# Patient Record
Sex: MALE
[De-identification: names, ages, dates, MRNs, and addresses within clinical notes are randomized per-mention and may not be internally consistent; named-entity substitution may affect disease eponyms.]

---

## 2020-07-15 ENCOUNTER — NURSE TRIAGE (OUTPATIENT)
Dept: OTHER | Facility: CLINIC | Age: 80
End: 2020-07-15

## 2020-07-15 NOTE — TELEPHONE ENCOUNTER
Reason for Disposition   [1] SEVERE back pain (e.g., excruciating, unable to do any normal activities) AND [2] not improved 2 hours after pain medicine    Answer Assessment - Initial Assessment Questions  1. ONSET: \"When did the pain begin? \"       7/12/2020  2. LOCATION: \"Where does it hurt? \" (upper, mid or lower back)      Lower back and runs down left leg and feels achy  3. SEVERITY: \"How bad is the pain? \"  (e.g., Scale 1-10; mild, moderate, or severe)    - MILD (1-3): doesn't interfere with normal activities     - MODERATE (4-7): interferes with normal activities or awakens from sleep     - SEVERE (8-10): excruciating pain, unable to do any normal activities       8  4. PATTERN: \"Is the pain constant? \" (e.g., yes, no; constant, intermittent)       Comes and goes  5. RADIATION: \"Does the pain shoot into your legs or elsewhere? \"      Yes, down the left leg  6. CAUSE:  \"What do you think is causing the back pain? \"       Infection from surgery  7. BACK OVERUSE:  [place] recent lifting of heavy objects, strenuous work or exercise? \"      no  8. MEDICATIONS: \"What have you taken so far for the pain? \" (e.g., nothing, acetaminophen, NSAIDS)      Taking pain meds from surgery  9. NEUROLOGIC SYMPTOMS: \"Do you have any weakness, numbness, or problems with bowel/bladder control? \"      no  10. OTHER SYMPTOMS: \"Do you have any other symptoms? \" (e.g., fever, abdominal pain, burning with urination, blood in urine)        Sweating, chills. 11. PREGNANCY: \"Is there any chance you are pregnant? \" (e.g., yes, no; LMP)        n/a    Protocols used: BACK PAIN-ADULT-    Caller had surgery on 7/6/2020. Caller started having chills, sweating, and increased pain that is running down his left leg on 7/10/2020. He has tried to call them and they have not returned his call. No SOB and no chest pain. Recommendation See HCP within 4 hours, or go to [address] [place], [place] [address]r [place].

## 2023-08-28 PROBLEM — M47.816 FACET DEGENERATION OF LUMBAR REGION: Status: ACTIVE | Noted: 2023-08-28

## 2023-08-28 PROBLEM — M54.50 LOW BACK PAIN: Status: ACTIVE | Noted: 2023-08-28

## 2023-08-28 PROBLEM — C61 PROSTATE CANCER (MULTI): Status: ACTIVE | Noted: 2023-08-28

## 2023-08-28 PROBLEM — H91.90 ACQUIRED HEARING LOSS: Status: ACTIVE | Noted: 2023-08-28

## 2023-08-28 PROBLEM — M54.16 LUMBAR RADICULITIS: Status: ACTIVE | Noted: 2023-08-28

## 2023-08-28 PROBLEM — E78.2 MIXED HYPERLIPIDEMIA: Status: ACTIVE | Noted: 2023-08-28

## 2023-08-28 PROBLEM — I10 ESSENTIAL HYPERTENSION: Status: ACTIVE | Noted: 2023-08-28

## 2023-08-28 PROBLEM — H61.23 BILATERAL IMPACTED CERUMEN: Status: ACTIVE | Noted: 2023-08-28

## 2023-08-28 PROBLEM — M19.90 DEGENERATIVE ARTHRITIS: Status: ACTIVE | Noted: 2023-08-28

## 2023-08-28 PROBLEM — E78.00 PURE HYPERCHOLESTEROLEMIA: Status: ACTIVE | Noted: 2023-08-28

## 2023-08-28 PROBLEM — M96.1 LUMBAR POST-LAMINECTOMY SYNDROME: Status: ACTIVE | Noted: 2023-08-28

## 2023-08-28 PROBLEM — J30.2 SEASONAL ALLERGIC RHINITIS: Status: ACTIVE | Noted: 2023-08-28

## 2023-08-28 PROBLEM — M54.2 CERVICALGIA: Status: ACTIVE | Noted: 2023-08-28

## 2023-08-28 PROBLEM — K21.9 GASTRO-ESOPHAGEAL REFLUX DISEASE WITHOUT ESOPHAGITIS: Status: ACTIVE | Noted: 2023-08-28

## 2023-08-28 PROBLEM — R06.02 SHORTNESS OF BREATH: Status: ACTIVE | Noted: 2023-08-28

## 2023-08-28 PROBLEM — J84.10 POSTINFLAMMATORY PULMONARY FIBROSIS (MULTI): Status: ACTIVE | Noted: 2023-08-28

## 2023-08-28 PROBLEM — J31.0 CHRONIC RHINITIS: Status: ACTIVE | Noted: 2023-08-28

## 2023-08-28 PROBLEM — M48.00 SPINAL STENOSIS: Status: ACTIVE | Noted: 2023-08-28

## 2023-08-28 PROBLEM — I10 BENIGN HYPERTENSION: Status: RESOLVED | Noted: 2023-08-28 | Resolved: 2023-08-28

## 2023-08-28 PROBLEM — M25.559 HIP PAIN, CHRONIC: Status: ACTIVE | Noted: 2023-08-28

## 2023-08-28 PROBLEM — Z98.890 STATUS POST LUMBAR SPINE SURGERY FOR DECOMPRESSION OF SPINAL CORD: Status: ACTIVE | Noted: 2023-08-28

## 2023-08-28 PROBLEM — G89.29 HIP PAIN, CHRONIC: Status: ACTIVE | Noted: 2023-08-28

## 2023-08-28 PROBLEM — J32.9 SINUSITIS: Status: ACTIVE | Noted: 2023-08-28

## 2023-08-28 PROBLEM — E55.9 VITAMIN D DEFICIENCY: Status: ACTIVE | Noted: 2023-08-28

## 2023-08-28 PROBLEM — M54.16 CHRONIC LUMBAR RADICULOPATHY: Status: ACTIVE | Noted: 2023-08-28

## 2023-08-28 PROBLEM — G62.9 NEUROPATHY: Status: ACTIVE | Noted: 2023-08-28

## 2023-08-28 PROBLEM — G56.00 CARPAL TUNNEL SYNDROME: Status: ACTIVE | Noted: 2023-08-28

## 2023-08-28 PROBLEM — M48.062 LUMBAR STENOSIS WITH NEUROGENIC CLAUDICATION: Status: ACTIVE | Noted: 2023-08-28

## 2023-08-28 PROBLEM — I20.9 ANGINA PECTORIS (CMS-HCC): Status: ACTIVE | Noted: 2023-08-28

## 2023-08-28 PROBLEM — R49.0 HOARSENESS: Status: ACTIVE | Noted: 2023-08-28

## 2023-08-28 PROBLEM — E16.1 REACTIVE HYPOGLYCEMIA: Status: ACTIVE | Noted: 2023-08-28

## 2023-08-28 PROBLEM — M43.10 ACQUIRED SPONDYLOLISTHESIS: Status: ACTIVE | Noted: 2023-08-28

## 2023-08-28 RX ORDER — ACETAMINOPHEN 325 MG/1
2 TABLET ORAL EVERY 4 HOURS PRN
Status: ON HOLD | COMMUNITY
End: 2024-05-23 | Stop reason: WASHOUT

## 2023-08-28 RX ORDER — PREDNISOLONE ACETATE 10 MG/ML
SUSPENSION/ DROPS OPHTHALMIC
COMMUNITY
Start: 2022-10-04 | End: 2023-11-02 | Stop reason: ALTCHOICE

## 2023-08-28 RX ORDER — VIT A/VIT C/VIT E/ZINC/COPPER 4296-226
1 CAPSULE ORAL DAILY
COMMUNITY

## 2023-08-28 RX ORDER — VIT C/E/ZN/COPPR/LUTEIN/ZEAXAN 250MG-90MG
1 CAPSULE ORAL DAILY
COMMUNITY

## 2023-08-28 RX ORDER — ATORVASTATIN CALCIUM 10 MG/1
1 TABLET, FILM COATED ORAL DAILY
COMMUNITY
Start: 2015-01-26 | End: 2023-12-16 | Stop reason: SDUPTHER

## 2023-08-28 RX ORDER — ATENOLOL AND CHLORTHALIDONE TABLET 100; 25 MG/1; MG/1
0.5 TABLET ORAL DAILY
COMMUNITY
Start: 2015-01-26

## 2023-08-28 RX ORDER — LANOLIN ALCOHOL/MO/W.PET/CERES
1 CREAM (GRAM) TOPICAL DAILY
COMMUNITY
Start: 2015-08-11

## 2023-08-28 RX ORDER — VIT C/E/ZN/COPPR/LUTEIN/ZEAXAN 250MG-90MG
1 CAPSULE ORAL DAILY
COMMUNITY
End: 2023-11-02 | Stop reason: ALTCHOICE

## 2023-08-28 RX ORDER — DEXTROMETHORPHAN HYDROBROMIDE, GUAIFENESIN 5; 100 MG/5ML; MG/5ML
1 LIQUID ORAL 3 TIMES DAILY
COMMUNITY
End: 2023-11-02 | Stop reason: ALTCHOICE

## 2023-08-28 RX ORDER — AZELASTINE 1 MG/ML
SPRAY, METERED NASAL AS NEEDED
COMMUNITY
Start: 2023-01-10

## 2023-08-28 RX ORDER — FLUTICASONE PROPIONATE 50 MCG
1 SPRAY, SUSPENSION (ML) NASAL AS NEEDED
COMMUNITY
Start: 2022-09-29

## 2023-11-02 ENCOUNTER — OFFICE VISIT (OUTPATIENT)
Dept: PRIMARY CARE | Facility: CLINIC | Age: 83
End: 2023-11-02
Payer: MEDICARE

## 2023-11-02 VITALS
HEIGHT: 65 IN | TEMPERATURE: 97.5 F | HEART RATE: 67 BPM | BODY MASS INDEX: 27.66 KG/M2 | OXYGEN SATURATION: 97 % | DIASTOLIC BLOOD PRESSURE: 65 MMHG | WEIGHT: 166 LBS | SYSTOLIC BLOOD PRESSURE: 118 MMHG

## 2023-11-02 DIAGNOSIS — E55.9 VITAMIN D DEFICIENCY: ICD-10-CM

## 2023-11-02 DIAGNOSIS — R73.01 IMPAIRED FASTING BLOOD SUGAR: ICD-10-CM

## 2023-11-02 DIAGNOSIS — M54.16 CHRONIC LUMBAR RADICULOPATHY: ICD-10-CM

## 2023-11-02 DIAGNOSIS — G62.9 NEUROPATHY: ICD-10-CM

## 2023-11-02 DIAGNOSIS — Z85.46 HISTORY OF PROSTATE CANCER: ICD-10-CM

## 2023-11-02 DIAGNOSIS — E78.2 MIXED HYPERLIPIDEMIA: ICD-10-CM

## 2023-11-02 DIAGNOSIS — I10 ESSENTIAL HYPERTENSION: Primary | ICD-10-CM

## 2023-11-02 DIAGNOSIS — M48.061 SPINAL STENOSIS OF LUMBAR REGION WITHOUT NEUROGENIC CLAUDICATION: ICD-10-CM

## 2023-11-02 PROCEDURE — 99214 OFFICE O/P EST MOD 30 MIN: CPT | Performed by: INTERNAL MEDICINE

## 2023-11-02 PROCEDURE — 3078F DIAST BP <80 MM HG: CPT | Performed by: INTERNAL MEDICINE

## 2023-11-02 PROCEDURE — 1159F MED LIST DOCD IN RCRD: CPT | Performed by: INTERNAL MEDICINE

## 2023-11-02 PROCEDURE — 3074F SYST BP LT 130 MM HG: CPT | Performed by: INTERNAL MEDICINE

## 2023-11-02 PROCEDURE — 1036F TOBACCO NON-USER: CPT | Performed by: INTERNAL MEDICINE

## 2023-11-02 PROCEDURE — 90471 IMMUNIZATION ADMIN: CPT | Performed by: INTERNAL MEDICINE

## 2023-11-02 PROCEDURE — 1126F AMNT PAIN NOTED NONE PRSNT: CPT | Performed by: INTERNAL MEDICINE

## 2023-11-02 PROCEDURE — 90715 TDAP VACCINE 7 YRS/> IM: CPT | Performed by: INTERNAL MEDICINE

## 2023-11-02 ASSESSMENT — ENCOUNTER SYMPTOMS
OCCASIONAL FEELINGS OF UNSTEADINESS: 0
PALPITATIONS: 0
DEPRESSION: 0
SHORTNESS OF BREATH: 0
LOSS OF SENSATION IN FEET: 0

## 2023-11-02 ASSESSMENT — PATIENT HEALTH QUESTIONNAIRE - PHQ9
1. LITTLE INTEREST OR PLEASURE IN DOING THINGS: NOT AT ALL
SUM OF ALL RESPONSES TO PHQ9 QUESTIONS 1 AND 2: 0
2. FEELING DOWN, DEPRESSED OR HOPELESS: NOT AT ALL

## 2023-11-02 ASSESSMENT — PAIN SCALES - GENERAL: PAINLEVEL: 0-NO PAIN

## 2023-11-02 NOTE — PATIENT INSTRUCTIONS
Shingrix shingles vaccine series of 2  by 2 to 3 months, RSV (respiratory syncytial virus), flu vaccine, COVID 19 new vaccine 10-11/23.  All can be obtained at the local pharmacies.    Tdap vaccine today.

## 2023-11-02 NOTE — PROGRESS NOTES
[facility]: [facility]  PROGRESS NOTE      Jonathan Campos is a 83 y.o. male that is presenting today for GERD (6 mo fu).    Assessment/Plan   Diagnoses and all orders for this visit:  Essential hypertension  Comments:  BP doing OK.  Mixed hyperlipidemia  -     Lipid Panel; Future  -     Comprehensive Metabolic Panel; Future  Chronic lumbar radiculopathy  History of prostate cancer  Spinal stenosis of lumbar region without neurogenic claudication  Neuropathy  -     Vitamin B12; Future  Impaired fasting blood sugar  -     Hemoglobin A1C; Future  -     CBC; Future  Vitamin D deficiency  Other orders  -     Follow Up In Primary Care - Medicare Annual; Future  -     Tdap vaccine, age 7 years and older  (BOOSTRIX)    Subjective   HTN, diet reviewed., IFBS., chol. DJD chronic, stable. Vaccines reviwed. Right eye , Retinal doc, membrane over retina. Delcines surgery risk of detachment. No night time drinvg just during day, short distances.    GERD  He reports no chest pain.     Review of Systems   Respiratory:  Negative for shortness of breath.    Cardiovascular:  Negative for chest pain and palpitations.   All other systems reviewed and are negative.     Objective   Vitals:    11/02/23 1338   BP: 118/65   Pulse: 67   Temp: 36.4 °C (97.5 °F)   SpO2: 97%      Body mass index is 27.62 kg/m².  Physical Exam  Constitutional:       General: He is not in acute distress.  HENT:      Head: Normocephalic and atraumatic.      Right Ear: Tympanic membrane normal.      Left Ear: Tympanic membrane normal.      Mouth/Throat:      Mouth: Mucous membranes are moist.      Pharynx: Oropharynx is clear.   Eyes:      Extraocular Movements: Extraocular movements intact.      Conjunctiva/sclera: Conjunctivae normal.      Pupils: Pupils are equal, round, and reactive to light.   Cardiovascular:      Rate and Rhythm: Normal rate and regular rhythm.   Pulmonary:      Breath sounds: Normal breath sounds.   Abdominal:  "     General: Bowel sounds are normal.      Palpations: Abdomen is soft. There is no mass.   Musculoskeletal:         General: Normal range of motion.      Cervical back: Neck supple. No tenderness.   Skin:     General: Skin is warm and dry.   Neurological:      General: No focal deficit present.      Mental Status: He is oriented to person, place, and time.     Diagnostic Results   Lab Results   Component Value Date    GLUCOSE 110 (H) 04/28/2023    CALCIUM 8.9 04/28/2023     04/28/2023    K 4.1 04/28/2023    CO2 28 04/28/2023     04/28/2023    BUN 17 04/28/2023    CREATININE 1.2 04/28/2023     Lab Results   Component Value Date    ALT 10 09/30/2022    AST 14 09/30/2022    ALKPHOS 63 09/30/2022    BILITOT 0.5 09/30/2022     Lab Results   Component Value Date    WBC 6.3 09/30/2022    HGB 14.4 09/30/2022    HCT 45.8 09/30/2022    MCV 89.8 09/30/2022     09/30/2022     Lab Results   Component Value Date    CHOL 152 09/30/2022    CHOL 151 09/09/2021    CHOL 159 03/12/2021     Lab Results   Component Value Date    HDL 32 (L) 09/30/2022    HDL 30 (L) 09/09/2021    HDL 36 (L) 03/12/2021     Lab Results   Component Value Date    LDLCALC 94 09/30/2022    LDLCALC 93 09/09/2021    LDLCALC 101 03/12/2021     Lab Results   Component Value Date    TRIG 128 09/30/2022    TRIG 141 09/09/2021    TRIG 109 03/12/2021     No components found for: \"CHOLHDL\"  No results found for: \"HGBA1C\"  Other labs not included in the list above were reviewed either before or during this encounter.    History    Past Medical History:   Diagnosis Date   • Chronic lumbar radiculopathy 08/28/2023   • Gastro-esophageal reflux disease without esophagitis 08/28/2023   • History of prostate cancer 11/02/2023    '99 prostectomy    • Impaired fasting blood sugar 11/02/2023   • Neuropathy 08/28/2023    Back related   • Personal history of malignant neoplasm of prostate     History of malignant neoplasm of prostate   • Spinal " "stenosis 08/28/2023   • Vitamin D deficiency 08/28/2023     Past Surgical History:   Procedure Laterality Date   • CT GUIDED PERCUTANEOUS BIOPSY BONE DEEP  12/09/2020    CT GUIDED PERCUTANEOUS BIOPSY BONE DEEP 12/9/2020 [facility]   • OTHER SURGICAL HISTORY  12/01/2020    Lumbar laminectomy 9/20    • OTHER SURGICAL HISTORY  12/01/2020    Spinal instrumentation placement   • OTHER SURGICAL HISTORY  05/03/2022    Prostate surgery     Family History   Problem Relation Name Age of Onset   • Cancer Mother       Social History     Socioeconomic History   • Marital status:      Spouse name: Not on file   • Number of children: Not on file   • Years of education: Not on file   • Highest education level: Not on file   Occupational History   • Not on file   Tobacco Use   • Smoking status: Never     Passive exposure: Never   • Smokeless tobacco: Never   Vaping Use   • Vaping Use: Never used   Substance and Sexual Activity   • Alcohol use: Never   • Drug use: Never   • Sexual activity: Not on file   Other Topics Concern   • Not on file   Social History Narrative   • Not on file     Social Determinants of Health     Financial Resource Strain: Not on file   Food Insecurity: Not on file   Transportation Needs: Not on file   Physical Activity: Not on file   Stress: Not on file   Social Connections: Not on file   Intimate Partner Violence: Not on file   Housing Stability: Not on file     Allergies   Allergen Reactions   • Carisoprodol Other and Unknown     \"Spaced out\"   • Pravastatin Sodium Other     myalgias   • Simvastatin Other     myalgias     Current Outpatient Medications on File Prior to Visit   Medication Sig Dispense Refill   • acetaminophen (TylenoL) 325 mg tablet Take 2 tablets (650 mg) by mouth every 4 hours if needed.     • atenoloL-chlorthalidone (Tenoretic) 100-25 mg tablet Take 0.5 tablets by mouth once daily.     • atorvastatin (Lipitor) 10 mg tablet Take 1 tablet (10 mg) by mouth once daily.   "   • azelastine (Astelin) 137 mcg (0.1 %) nasal spray      • cholecalciferol (Vitamin D-3) 25 MCG (1000 UT) capsule Take 1 capsule (25 mcg) by mouth once daily.     • cyanocobalamin (Vitamin B-12) 1,000 mcg tablet Take 1 tablet (1,000 mcg) by mouth once daily.     • fluticasone (Flonase) 50 mcg/actuation nasal spray Administer 1 spray into each nostril once daily.     • vitamins A,C,E-zinc-copper (PreserVision AREDS) 4,296 mcg-226 mg-90 mg capsule Take 1 tablet by mouth once daily.     • [DISCONTINUED] acetaminophen (Tylenol 8 Hour) 650 mg ER tablet Take 1 tablet (650 mg) by mouth 3 times a day.     • [DISCONTINUED] prednisoLONE acetate (Pred-Forte) 1 % ophthalmic suspension Administer into affected eye(s).     • [DISCONTINUED] vit C,Q-Qj-tbncw-lutein-zeaxan (PreserVision AREDS-2) 250-90-40-1 mg capsule Take 1 capsule by mouth once daily.       No current facility-administered medications on file prior to visit.     Immunization History   Administered Date(s) Administered   • Flu vaccine, quadrivalent, high-dose, preservative free, age 65y+ (FLUZONE) 09/21/2020, 09/27/2021   • Influenza, High Dose Seasonal, Preservative Free 11/03/2016, 10/15/2018   • Influenza, injectable, quadrivalent 11/06/2017   • Influenza, seasonal, injectable 10/19/2010, 09/15/2011, 10/11/2012, 09/12/2013, 10/16/2014   • Moderna SARS-CoV-2 Vaccination 01/01/2021, 01/29/2021, 02/28/2021   • Pneumococcal conjugate vaccine, 13-valent (PREVNAR 13) 10/20/2015   • Pneumococcal polysaccharide vaccine, 23-valent, age 2 years and older (PNEUMOVAX 23) 12/01/2005, 11/03/2016   • Td vaccine, age 7 years and older (TDVAX) 09/01/2002   • Tdap vaccine, age 7 year and older (BOOSTRIX) 03/14/2013   • Zoster, live 04/16/2015     Patient's medical history was reviewed and updated either before or during this encounter.       Jon Mcintyre MD

## 2023-11-03 ENCOUNTER — LAB (OUTPATIENT)
Dept: LAB | Facility: LAB | Age: 83
End: 2023-11-03
Payer: MEDICARE

## 2023-11-03 DIAGNOSIS — E55.9 VITAMIN D DEFICIENCY: ICD-10-CM

## 2023-11-03 DIAGNOSIS — E78.2 MIXED HYPERLIPIDEMIA: ICD-10-CM

## 2023-11-03 DIAGNOSIS — G62.9 NEUROPATHY: ICD-10-CM

## 2023-11-03 DIAGNOSIS — R73.01 IMPAIRED FASTING BLOOD SUGAR: ICD-10-CM

## 2023-11-03 LAB
25(OH)D3 SERPL-MCNC: 64 NG/ML (ref 30–100)
ALBUMIN SERPL BCP-MCNC: 3.7 G/DL (ref 3.4–5)
ALP SERPL-CCNC: 51 U/L (ref 33–136)
ALT SERPL W P-5'-P-CCNC: 10 U/L (ref 10–52)
ANION GAP SERPL CALC-SCNC: 11 MMOL/L (ref 10–20)
AST SERPL W P-5'-P-CCNC: 15 U/L (ref 9–39)
BILIRUB SERPL-MCNC: 0.5 MG/DL (ref 0–1.2)
BUN SERPL-MCNC: 14 MG/DL (ref 6–23)
CALCIUM SERPL-MCNC: 9.1 MG/DL (ref 8.6–10.3)
CHLORIDE SERPL-SCNC: 101 MMOL/L (ref 98–107)
CHOLEST SERPL-MCNC: 132 MG/DL (ref 0–199)
CHOLESTEROL/HDL RATIO: 4
CO2 SERPL-SCNC: 32 MMOL/L (ref 21–32)
CREAT SERPL-MCNC: 1.07 MG/DL (ref 0.5–1.3)
ERYTHROCYTE [DISTWIDTH] IN BLOOD BY AUTOMATED COUNT: 13.8 % (ref 11.5–14.5)
EST. AVERAGE GLUCOSE BLD GHB EST-MCNC: 123 MG/DL
GFR SERPL CREATININE-BSD FRML MDRD: 68 ML/MIN/1.73M*2
GLUCOSE SERPL-MCNC: 99 MG/DL (ref 74–99)
HBA1C MFR BLD: 5.9 %
HCT VFR BLD AUTO: 46.9 % (ref 41–52)
HDLC SERPL-MCNC: 32.9 MG/DL
HGB BLD-MCNC: 14.6 G/DL (ref 13.5–17.5)
LDLC SERPL CALC-MCNC: 79 MG/DL
MCH RBC QN AUTO: 28.6 PG (ref 26–34)
MCHC RBC AUTO-ENTMCNC: 31.1 G/DL (ref 32–36)
MCV RBC AUTO: 92 FL (ref 80–100)
NON HDL CHOLESTEROL: 99 MG/DL (ref 0–149)
NRBC BLD-RTO: 0 /100 WBCS (ref 0–0)
PLATELET # BLD AUTO: 220 X10*3/UL (ref 150–450)
POTASSIUM SERPL-SCNC: 3.5 MMOL/L (ref 3.5–5.3)
PROT SERPL-MCNC: 6.4 G/DL (ref 6.4–8.2)
RBC # BLD AUTO: 5.1 X10*6/UL (ref 4.5–5.9)
SODIUM SERPL-SCNC: 140 MMOL/L (ref 136–145)
TRIGL SERPL-MCNC: 99 MG/DL (ref 0–149)
VIT B12 SERPL-MCNC: 957 PG/ML (ref 211–911)
VLDL: 20 MG/DL (ref 0–40)
WBC # BLD AUTO: 6.4 X10*3/UL (ref 4.4–11.3)

## 2023-11-03 PROCEDURE — 82607 VITAMIN B-12: CPT

## 2023-11-03 PROCEDURE — 83036 HEMOGLOBIN GLYCOSYLATED A1C: CPT

## 2023-11-03 PROCEDURE — 36415 COLL VENOUS BLD VENIPUNCTURE: CPT

## 2023-11-03 PROCEDURE — 82306 VITAMIN D 25 HYDROXY: CPT

## 2023-12-16 DIAGNOSIS — E78.2 MIXED HYPERLIPIDEMIA: Primary | ICD-10-CM

## 2023-12-17 RX ORDER — ATORVASTATIN CALCIUM 10 MG/1
10 TABLET, FILM COATED ORAL DAILY
Qty: 90 TABLET | Refills: 3 | Status: SHIPPED | OUTPATIENT
Start: 2023-12-17 | End: 2024-12-11

## 2024-01-09 ENCOUNTER — OFFICE VISIT (OUTPATIENT)
Dept: OTOLARYNGOLOGY | Facility: CLINIC | Age: 84
End: 2024-01-09
Payer: MEDICARE

## 2024-01-09 VITALS — BODY MASS INDEX: 27.66 KG/M2 | HEIGHT: 65 IN | WEIGHT: 166 LBS | TEMPERATURE: 97.1 F

## 2024-01-09 DIAGNOSIS — J31.0 CHRONIC RHINITIS: ICD-10-CM

## 2024-01-09 DIAGNOSIS — H61.20 CERUMEN IN AUDITORY CANAL ON EXAMINATION: Primary | ICD-10-CM

## 2024-01-09 PROCEDURE — 1036F TOBACCO NON-USER: CPT | Performed by: OTOLARYNGOLOGY

## 2024-01-09 PROCEDURE — 1160F RVW MEDS BY RX/DR IN RCRD: CPT | Performed by: OTOLARYNGOLOGY

## 2024-01-09 PROCEDURE — 1126F AMNT PAIN NOTED NONE PRSNT: CPT | Performed by: OTOLARYNGOLOGY

## 2024-01-09 PROCEDURE — 99213 OFFICE O/P EST LOW 20 MIN: CPT | Performed by: OTOLARYNGOLOGY

## 2024-01-09 PROCEDURE — 1159F MED LIST DOCD IN RCRD: CPT | Performed by: OTOLARYNGOLOGY

## 2024-01-09 NOTE — PROGRESS NOTES
"HPI  Jonathan Campos is a 83 y.o. male history of chronic rhinitis.  Does well with Jasmine pot and uses azelastine infrequently.  He has required cerumen management in the past but is using Debrox regularly and it has not been bubbling much recently.  Feels his hearing is stable.  Denies otalgia and otorrhea.      Past Medical History:   Diagnosis Date    Chronic lumbar radiculopathy 08/28/2023    Gastro-esophageal reflux disease without esophagitis 08/28/2023    History of prostate cancer 11/02/2023    '99 prostectomy     Impaired fasting blood sugar 11/02/2023    Neuropathy 08/28/2023    Back related    Personal history of malignant neoplasm of prostate     History of malignant neoplasm of prostate    Spinal stenosis 08/28/2023    Vitamin D deficiency 08/28/2023            Medications:     Current Outpatient Medications:     acetaminophen (TylenoL) 325 mg tablet, Take 2 tablets (650 mg) by mouth every 4 hours if needed., Disp: , Rfl:     atenoloL-chlorthalidone (Tenoretic) 100-25 mg tablet, Take 0.5 tablets by mouth once daily., Disp: , Rfl:     atorvastatin (Lipitor) 10 mg tablet, Take 1 tablet (10 mg) by mouth once daily., Disp: 90 tablet, Rfl: 3    cholecalciferol (Vitamin D-3) 25 MCG (1000 UT) capsule, Take 1 capsule (25 mcg) by mouth once daily., Disp: , Rfl:     cyanocobalamin (Vitamin B-12) 1,000 mcg tablet, Take 1 tablet (1,000 mcg) by mouth once daily., Disp: , Rfl:     vitamins A,C,E-zinc-copper (PreserVision AREDS) 4,296 mcg-226 mg-90 mg capsule, Take 1 tablet by mouth once daily., Disp: , Rfl:     azelastine (Astelin) 137 mcg (0.1 %) nasal spray, , Disp: , Rfl:     fluticasone (Flonase) 50 mcg/actuation nasal spray, Administer 1 spray into each nostril once daily., Disp: , Rfl:      Allergies:  Allergies   Allergen Reactions    Carisoprodol Other and Unknown     \"Spaced out\"    Pravastatin Sodium Other     myalgias    Simvastatin Other     myalgias        Physical Exam:  Last Recorded " "Vitals  Temperature 36.2 °C (97.1 °F), height 1.651 m (5' 5\"), weight 75.3 kg (166 lb).  General:     General appearance: Well-developed, well-nourished in no acute distress.       Voice:  normal       Head/face: Normal appearance; nontender to palpation     Facial nerve function: Normal and symmetric bilaterally.    Oral/oropharynx:     Oral vestibule: Normal labial and gingival mucosa     Tongue/floor of mouth: Normal without lesion     Oropharynx: Clear.  No lesions present of the hard/soft palate, posterior pharynx    Neck:     Neck: Normal appearance, trachea midline     Salivary glands: Normal to palpation bilaterally     Lymph nodes: No cervical lymphadenopathy to palpation     Thyroid: No thyromegaly.  No palpable nodules     Range of motion: Normal    Neurological:     Cortical functions: Alert and oriented x3, appropriate affect       Larynx/hypopharynx:     Laryngeal findings: Mirror exam inadequate or limited secondary to enlarged base of tongue and/or excessive gagging    Ear:     Ear canal: Normal bilaterally.  A couple hairs were removed bilaterally with microscope and alligator.  Some wax on the left was removed with wire-loop.  Generally pretty clean     Tympanic membrane: Intact and mobile bilaterally     Pinna: Normal bilaterally     Hearing:  Gross hearing assessment normal by voice    Nose:     Visualized using: Anterior rhinoscopy     Nasopharynx: Inadequate mirror exam secondary to gag, anatomy.       Nasal dorsum: Nontraumatic midline appearance     Septum: Midline     Inferior turbinates: Normally sized     Mucosa: Bilateral, pink, normal appearing       Assessment/Plan   He had very little debris in his ear canals today.  He is doing well from the perspective of his rhinitis.  We will extend his visits to annually from semiannually and I will see him back in a year, sooner as needed         Timothy Martinez MD  "

## 2024-05-14 PROBLEM — M25.559 ARTHRALGIA OF HIP: Status: ACTIVE | Noted: 2022-11-28

## 2024-05-14 PROBLEM — M48.061 SPINAL STENOSIS OF LUMBAR REGION: Status: ACTIVE | Noted: 2018-03-16

## 2024-05-14 PROBLEM — E66.3 OVERWEIGHT WITH BODY MASS INDEX (BMI) 25.0-29.9: Status: ACTIVE | Noted: 2024-05-14

## 2024-05-14 PROBLEM — H61.20 WAX IN EAR: Status: ACTIVE | Noted: 2024-05-14

## 2024-05-14 PROBLEM — R10.30 LOWER ABDOMINAL PAIN: Status: ACTIVE | Noted: 2024-05-14

## 2024-05-14 PROBLEM — J32.9 SINUSITIS: Status: ACTIVE | Noted: 2024-05-14

## 2024-05-14 PROBLEM — C61 CARCINOMA OF PROSTATE (MULTI): Status: ACTIVE | Noted: 2024-05-14

## 2024-05-14 PROBLEM — H90.3 SENSORINEURAL HEARING LOSS (SNHL) OF BOTH EARS: Status: ACTIVE | Noted: 2024-05-14

## 2024-05-14 PROBLEM — H61.20 IMPACTED CERUMEN: Status: ACTIVE | Noted: 2024-05-14

## 2024-05-14 PROBLEM — J84.10 POSTINFLAMMATORY PULMONARY FIBROSIS (MULTI): Status: ACTIVE | Noted: 2022-09-30

## 2024-05-14 PROBLEM — M43.10 DEGENERATIVE SPONDYLOLISTHESIS: Status: ACTIVE | Noted: 2018-03-16

## 2024-05-14 PROBLEM — R05.8 OTHER SPECIFIED COUGH: Status: ACTIVE | Noted: 2022-11-28

## 2024-05-14 PROBLEM — E78.00 PURE HYPERCHOLESTEROLEMIA: Status: ACTIVE | Noted: 2022-09-30

## 2024-05-14 PROBLEM — I10 BENIGN HYPERTENSION: Status: ACTIVE | Noted: 2022-03-17

## 2024-05-14 PROBLEM — E16.1 REACTIVE HYPOGLYCEMIA: Status: ACTIVE | Noted: 2024-05-14

## 2024-05-14 PROBLEM — N32.81 OVERACTIVE BLADDER: Status: ACTIVE | Noted: 2024-05-14

## 2024-05-23 ENCOUNTER — APPOINTMENT (OUTPATIENT)
Dept: RADIOLOGY | Facility: HOSPITAL | Age: 84
End: 2024-05-23
Payer: MEDICARE

## 2024-05-23 ENCOUNTER — HOSPITAL ENCOUNTER (OUTPATIENT)
Facility: HOSPITAL | Age: 84
Setting detail: OBSERVATION
Discharge: HOME | End: 2024-05-24
Attending: FAMILY MEDICINE | Admitting: INTERNAL MEDICINE
Payer: MEDICARE

## 2024-05-23 DIAGNOSIS — K29.90 GASTRITIS AND DUODENITIS: ICD-10-CM

## 2024-05-23 DIAGNOSIS — K85.90 ACUTE PANCREATITIS, UNSPECIFIED COMPLICATION STATUS, UNSPECIFIED PANCREATITIS TYPE (HHS-HCC): Primary | ICD-10-CM

## 2024-05-23 DIAGNOSIS — E87.6 HYPOKALEMIA: ICD-10-CM

## 2024-05-23 LAB
ALBUMIN SERPL BCP-MCNC: 4.1 G/DL (ref 3.4–5)
ALP SERPL-CCNC: 50 U/L (ref 33–136)
ALT SERPL W P-5'-P-CCNC: 16 U/L (ref 10–52)
ANION GAP SERPL CALC-SCNC: 12 MMOL/L (ref 10–20)
AST SERPL W P-5'-P-CCNC: 20 U/L (ref 9–39)
BASOPHILS # BLD AUTO: 0.03 X10*3/UL (ref 0–0.1)
BASOPHILS NFR BLD AUTO: 0.2 %
BILIRUB SERPL-MCNC: 1.1 MG/DL (ref 0–1.2)
BUN SERPL-MCNC: 18 MG/DL (ref 6–23)
CALCIUM SERPL-MCNC: 9.5 MG/DL (ref 8.6–10.3)
CHLORIDE SERPL-SCNC: 95 MMOL/L (ref 98–107)
CO2 SERPL-SCNC: 30 MMOL/L (ref 21–32)
CREAT SERPL-MCNC: 1.15 MG/DL (ref 0.5–1.3)
EGFRCR SERPLBLD CKD-EPI 2021: 63 ML/MIN/1.73M*2
EOSINOPHIL # BLD AUTO: 0.12 X10*3/UL (ref 0–0.4)
EOSINOPHIL NFR BLD AUTO: 0.8 %
ERYTHROCYTE [DISTWIDTH] IN BLOOD BY AUTOMATED COUNT: 13.5 % (ref 11.5–14.5)
ERYTHROCYTE [DISTWIDTH] IN BLOOD BY AUTOMATED COUNT: 13.6 % (ref 11.5–14.5)
GLUCOSE SERPL-MCNC: 87 MG/DL (ref 74–99)
HCT VFR BLD AUTO: 38.5 % (ref 41–52)
HCT VFR BLD AUTO: 45.4 % (ref 41–52)
HGB BLD-MCNC: 12.4 G/DL (ref 13.5–17.5)
HGB BLD-MCNC: 14.5 G/DL (ref 13.5–17.5)
IMM GRANULOCYTES # BLD AUTO: 0.04 X10*3/UL (ref 0–0.5)
IMM GRANULOCYTES NFR BLD AUTO: 0.3 % (ref 0–0.9)
LIPASE SERPL-CCNC: 66 U/L (ref 9–82)
LYMPHOCYTES # BLD AUTO: 1.25 X10*3/UL (ref 0.8–3)
LYMPHOCYTES NFR BLD AUTO: 8.7 %
MCH RBC QN AUTO: 28.7 PG (ref 26–34)
MCH RBC QN AUTO: 29 PG (ref 26–34)
MCHC RBC AUTO-ENTMCNC: 31.9 G/DL (ref 32–36)
MCHC RBC AUTO-ENTMCNC: 32.2 G/DL (ref 32–36)
MCV RBC AUTO: 90 FL (ref 80–100)
MCV RBC AUTO: 90 FL (ref 80–100)
MONOCYTES # BLD AUTO: 1.34 X10*3/UL (ref 0.05–0.8)
MONOCYTES NFR BLD AUTO: 9.3 %
NEUTROPHILS # BLD AUTO: 11.61 X10*3/UL (ref 1.6–5.5)
NEUTROPHILS NFR BLD AUTO: 80.7 %
NRBC BLD-RTO: 0 /100 WBCS (ref 0–0)
NRBC BLD-RTO: 0 /100 WBCS (ref 0–0)
PLATELET # BLD AUTO: 186 X10*3/UL (ref 150–450)
PLATELET # BLD AUTO: 228 X10*3/UL (ref 150–450)
POTASSIUM SERPL-SCNC: 3.1 MMOL/L (ref 3.5–5.3)
PROT SERPL-MCNC: 7.4 G/DL (ref 6.4–8.2)
RBC # BLD AUTO: 4.28 X10*6/UL (ref 4.5–5.9)
RBC # BLD AUTO: 5.06 X10*6/UL (ref 4.5–5.9)
SODIUM SERPL-SCNC: 134 MMOL/L (ref 136–145)
WBC # BLD AUTO: 10.9 X10*3/UL (ref 4.4–11.3)
WBC # BLD AUTO: 14.4 X10*3/UL (ref 4.4–11.3)

## 2024-05-23 PROCEDURE — 85025 COMPLETE CBC W/AUTO DIFF WBC: CPT | Performed by: FAMILY MEDICINE

## 2024-05-23 PROCEDURE — A9575 INJ GADOTERATE MEGLUMI 0.1ML: HCPCS | Performed by: INTERNAL MEDICINE

## 2024-05-23 PROCEDURE — 74183 MRI ABD W/O CNTR FLWD CNTR: CPT

## 2024-05-23 PROCEDURE — 96374 THER/PROPH/DIAG INJ IV PUSH: CPT | Mod: 59

## 2024-05-23 PROCEDURE — G0378 HOSPITAL OBSERVATION PER HR: HCPCS

## 2024-05-23 PROCEDURE — 96361 HYDRATE IV INFUSION ADD-ON: CPT

## 2024-05-23 PROCEDURE — 74183 MRI ABD W/O CNTR FLWD CNTR: CPT | Performed by: RADIOLOGY

## 2024-05-23 PROCEDURE — 2500000001 HC RX 250 WO HCPCS SELF ADMINISTERED DRUGS (ALT 637 FOR MEDICARE OP): Performed by: NURSE PRACTITIONER

## 2024-05-23 PROCEDURE — 36415 COLL VENOUS BLD VENIPUNCTURE: CPT | Performed by: NURSE PRACTITIONER

## 2024-05-23 PROCEDURE — 83690 ASSAY OF LIPASE: CPT | Performed by: FAMILY MEDICINE

## 2024-05-23 PROCEDURE — 2500000004 HC RX 250 GENERAL PHARMACY W/ HCPCS (ALT 636 FOR OP/ED): Performed by: INTERNAL MEDICINE

## 2024-05-23 PROCEDURE — 36415 COLL VENOUS BLD VENIPUNCTURE: CPT | Performed by: FAMILY MEDICINE

## 2024-05-23 PROCEDURE — 2550000001 HC RX 255 CONTRASTS: Performed by: FAMILY MEDICINE

## 2024-05-23 PROCEDURE — 85027 COMPLETE CBC AUTOMATED: CPT | Mod: 59 | Performed by: NURSE PRACTITIONER

## 2024-05-23 PROCEDURE — 76376 3D RENDER W/INTRP POSTPROCES: CPT | Performed by: RADIOLOGY

## 2024-05-23 PROCEDURE — 2500000004 HC RX 250 GENERAL PHARMACY W/ HCPCS (ALT 636 FOR OP/ED): Performed by: NURSE PRACTITIONER

## 2024-05-23 PROCEDURE — 84075 ASSAY ALKALINE PHOSPHATASE: CPT | Performed by: FAMILY MEDICINE

## 2024-05-23 PROCEDURE — 2500000004 HC RX 250 GENERAL PHARMACY W/ HCPCS (ALT 636 FOR OP/ED): Performed by: FAMILY MEDICINE

## 2024-05-23 PROCEDURE — 74177 CT ABD & PELVIS W/CONTRAST: CPT | Performed by: SURGERY

## 2024-05-23 PROCEDURE — 96372 THER/PROPH/DIAG INJ SC/IM: CPT | Mod: 59 | Performed by: NURSE PRACTITIONER

## 2024-05-23 PROCEDURE — 96375 TX/PRO/DX INJ NEW DRUG ADDON: CPT

## 2024-05-23 PROCEDURE — 99285 EMERGENCY DEPT VISIT HI MDM: CPT | Mod: 25

## 2024-05-23 PROCEDURE — 2500000006 HC RX 250 W HCPCS SELF ADMINISTERED DRUGS (ALT 637 FOR ALL PAYERS): Performed by: NURSE PRACTITIONER

## 2024-05-23 PROCEDURE — 74177 CT ABD & PELVIS W/CONTRAST: CPT

## 2024-05-23 RX ORDER — GADOTERATE MEGLUMINE 376.9 MG/ML
15 INJECTION INTRAVENOUS
Status: COMPLETED | OUTPATIENT
Start: 2024-05-23 | End: 2024-05-23

## 2024-05-23 RX ORDER — ATENOLOL 50 MG/1
50 TABLET ORAL DAILY
Status: DISCONTINUED | OUTPATIENT
Start: 2024-05-23 | End: 2024-05-24

## 2024-05-23 RX ORDER — LANOLIN ALCOHOL/MO/W.PET/CERES
1000 CREAM (GRAM) TOPICAL DAILY
Status: DISCONTINUED | OUTPATIENT
Start: 2024-05-23 | End: 2024-05-24 | Stop reason: HOSPADM

## 2024-05-23 RX ORDER — ONDANSETRON HYDROCHLORIDE 2 MG/ML
4 INJECTION, SOLUTION INTRAVENOUS ONCE
Status: COMPLETED | OUTPATIENT
Start: 2024-05-23 | End: 2024-05-23

## 2024-05-23 RX ORDER — PANTOPRAZOLE SODIUM 40 MG/10ML
40 INJECTION, POWDER, LYOPHILIZED, FOR SOLUTION INTRAVENOUS
Status: DISCONTINUED | OUTPATIENT
Start: 2024-05-24 | End: 2024-05-24 | Stop reason: HOSPADM

## 2024-05-23 RX ORDER — AZELASTINE 1 MG/ML
1 SPRAY, METERED NASAL 2 TIMES DAILY
Status: DISCONTINUED | OUTPATIENT
Start: 2024-05-23 | End: 2024-05-24 | Stop reason: HOSPADM

## 2024-05-23 RX ORDER — FLUTICASONE PROPIONATE 50 MCG
1 SPRAY, SUSPENSION (ML) NASAL AS NEEDED
Status: DISCONTINUED | OUTPATIENT
Start: 2024-05-23 | End: 2024-05-24 | Stop reason: HOSPADM

## 2024-05-23 RX ORDER — ACETAMINOPHEN 325 MG/1
650 TABLET ORAL EVERY 4 HOURS PRN
Status: DISCONTINUED | OUTPATIENT
Start: 2024-05-23 | End: 2024-05-24 | Stop reason: HOSPADM

## 2024-05-23 RX ORDER — PANTOPRAZOLE SODIUM 40 MG/1
40 TABLET, DELAYED RELEASE ORAL
Status: DISCONTINUED | OUTPATIENT
Start: 2024-05-24 | End: 2024-05-24 | Stop reason: HOSPADM

## 2024-05-23 RX ORDER — POTASSIUM CHLORIDE 20 MEQ/1
40 TABLET, EXTENDED RELEASE ORAL ONCE
Status: COMPLETED | OUTPATIENT
Start: 2024-05-23 | End: 2024-05-23

## 2024-05-23 RX ORDER — KETOROLAC TROMETHAMINE 15 MG/ML
15 INJECTION, SOLUTION INTRAMUSCULAR; INTRAVENOUS EVERY 6 HOURS PRN
Status: DISCONTINUED | OUTPATIENT
Start: 2024-05-23 | End: 2024-05-24 | Stop reason: HOSPADM

## 2024-05-23 RX ORDER — SODIUM CHLORIDE 9 MG/ML
10 INJECTION, SOLUTION INTRAVENOUS CONTINUOUS PRN
Status: DISCONTINUED | OUTPATIENT
Start: 2024-05-23 | End: 2024-05-24 | Stop reason: HOSPADM

## 2024-05-23 RX ORDER — ONDANSETRON HYDROCHLORIDE 2 MG/ML
4 INJECTION, SOLUTION INTRAVENOUS EVERY 8 HOURS PRN
Status: DISCONTINUED | OUTPATIENT
Start: 2024-05-23 | End: 2024-05-24 | Stop reason: HOSPADM

## 2024-05-23 RX ORDER — ENOXAPARIN SODIUM 100 MG/ML
40 INJECTION SUBCUTANEOUS EVERY 24 HOURS
Status: DISCONTINUED | OUTPATIENT
Start: 2024-05-23 | End: 2024-05-24 | Stop reason: HOSPADM

## 2024-05-23 RX ORDER — CHOLECALCIFEROL (VITAMIN D3) 25 MCG
2000 TABLET ORAL DAILY
Status: DISCONTINUED | OUTPATIENT
Start: 2024-05-23 | End: 2024-05-24 | Stop reason: HOSPADM

## 2024-05-23 RX ORDER — ONDANSETRON 4 MG/1
4 TABLET, FILM COATED ORAL EVERY 8 HOURS PRN
Status: DISCONTINUED | OUTPATIENT
Start: 2024-05-23 | End: 2024-05-24 | Stop reason: HOSPADM

## 2024-05-23 RX ORDER — CHLORTHALIDONE 25 MG/1
12.5 TABLET ORAL DAILY
Status: DISCONTINUED | OUTPATIENT
Start: 2024-05-23 | End: 2024-05-24

## 2024-05-23 RX ORDER — DEXTROMETHORPHAN HYDROBROMIDE, GUAIFENESIN 5; 100 MG/5ML; MG/5ML
2 LIQUID ORAL
COMMUNITY

## 2024-05-23 RX ORDER — KETOROLAC TROMETHAMINE 15 MG/ML
15 INJECTION, SOLUTION INTRAMUSCULAR; INTRAVENOUS ONCE
Status: COMPLETED | OUTPATIENT
Start: 2024-05-23 | End: 2024-05-23

## 2024-05-23 RX ORDER — ATORVASTATIN CALCIUM 10 MG/1
10 TABLET, FILM COATED ORAL NIGHTLY
Status: DISCONTINUED | OUTPATIENT
Start: 2024-05-23 | End: 2024-05-24 | Stop reason: HOSPADM

## 2024-05-23 RX ORDER — AMOXICILLIN 250 MG
2 CAPSULE ORAL NIGHTLY
Status: DISCONTINUED | OUTPATIENT
Start: 2024-05-23 | End: 2024-05-24 | Stop reason: HOSPADM

## 2024-05-23 RX ORDER — SODIUM CHLORIDE 9 MG/ML
100 INJECTION, SOLUTION INTRAVENOUS CONTINUOUS
Status: DISCONTINUED | OUTPATIENT
Start: 2024-05-23 | End: 2024-05-24 | Stop reason: HOSPADM

## 2024-05-23 RX ORDER — DEXTROMETHORPHAN HYDROBROMIDE, GUAIFENESIN 5; 100 MG/5ML; MG/5ML
650 LIQUID ORAL
COMMUNITY

## 2024-05-23 RX ORDER — TALC
3 POWDER (GRAM) TOPICAL NIGHTLY PRN
Status: DISCONTINUED | OUTPATIENT
Start: 2024-05-23 | End: 2024-05-24 | Stop reason: HOSPADM

## 2024-05-23 RX ADMIN — CHLORTHALIDONE 12.5 MG: 25 TABLET ORAL at 14:37

## 2024-05-23 RX ADMIN — KETOROLAC TROMETHAMINE 15 MG: 15 INJECTION INTRAMUSCULAR; INTRAVENOUS at 04:06

## 2024-05-23 RX ADMIN — SODIUM CHLORIDE 100 ML/HR: 9 INJECTION, SOLUTION INTRAVENOUS at 13:20

## 2024-05-23 RX ADMIN — POTASSIUM CHLORIDE 40 MEQ: 1500 TABLET, EXTENDED RELEASE ORAL at 14:35

## 2024-05-23 RX ADMIN — ONDANSETRON 4 MG: 2 INJECTION INTRAMUSCULAR; INTRAVENOUS at 04:06

## 2024-05-23 RX ADMIN — IOHEXOL 75 ML: 350 INJECTION, SOLUTION INTRAVENOUS at 04:37

## 2024-05-23 RX ADMIN — ATORVASTATIN CALCIUM 10 MG: 10 TABLET, FILM COATED ORAL at 20:32

## 2024-05-23 RX ADMIN — ENOXAPARIN SODIUM 40 MG: 40 INJECTION SUBCUTANEOUS at 13:19

## 2024-05-23 RX ADMIN — AZELASTINE HYDROCHLORIDE 1 SPRAY: 137 SPRAY, METERED NASAL at 14:36

## 2024-05-23 RX ADMIN — Medication 2000 UNITS: at 14:34

## 2024-05-23 RX ADMIN — SODIUM CHLORIDE 500 ML: 9 INJECTION, SOLUTION INTRAVENOUS at 04:05

## 2024-05-23 RX ADMIN — CYANOCOBALAMIN TAB 1000 MCG 1000 MCG: 1000 TAB at 14:34

## 2024-05-23 RX ADMIN — SODIUM CHLORIDE 100 ML/HR: 9 INJECTION, SOLUTION INTRAVENOUS at 23:30

## 2024-05-23 RX ADMIN — GADOTERATE MEGLUMINE 15 ML: 376.9 INJECTION INTRAVENOUS at 14:19

## 2024-05-23 RX ADMIN — ATENOLOL 50 MG: 50 TABLET ORAL at 14:34

## 2024-05-23 SDOH — SOCIAL STABILITY: SOCIAL INSECURITY: DOES ANYONE TRY TO KEEP YOU FROM HAVING/CONTACTING OTHER FRIENDS OR DOING THINGS OUTSIDE YOUR HOME?: NO

## 2024-05-23 SDOH — SOCIAL STABILITY: SOCIAL INSECURITY: ARE YOU OR HAVE YOU BEEN THREATENED OR ABUSED PHYSICALLY, EMOTIONALLY, OR SEXUALLY BY ANYONE?: NO

## 2024-05-23 SDOH — SOCIAL STABILITY: SOCIAL INSECURITY: HAVE YOU HAD THOUGHTS OF HARMING ANYONE ELSE?: NO

## 2024-05-23 SDOH — SOCIAL STABILITY: SOCIAL INSECURITY: HAS ANYONE EVER THREATENED TO HURT YOUR FAMILY OR YOUR PETS?: NO

## 2024-05-23 SDOH — SOCIAL STABILITY: SOCIAL INSECURITY: DO YOU FEEL ANYONE HAS EXPLOITED OR TAKEN ADVANTAGE OF YOU FINANCIALLY OR OF YOUR PERSONAL PROPERTY?: NO

## 2024-05-23 SDOH — SOCIAL STABILITY: SOCIAL INSECURITY: HAVE YOU HAD ANY THOUGHTS OF HARMING ANYONE ELSE?: NO

## 2024-05-23 SDOH — SOCIAL STABILITY: SOCIAL INSECURITY: ARE THERE ANY APPARENT SIGNS OF INJURIES/BEHAVIORS THAT COULD BE RELATED TO ABUSE/NEGLECT?: NO

## 2024-05-23 SDOH — SOCIAL STABILITY: SOCIAL INSECURITY: ABUSE: ADULT

## 2024-05-23 SDOH — SOCIAL STABILITY: SOCIAL INSECURITY: DO YOU FEEL UNSAFE GOING BACK TO THE PLACE WHERE YOU ARE LIVING?: NO

## 2024-05-23 ASSESSMENT — COGNITIVE AND FUNCTIONAL STATUS - GENERAL
PATIENT BASELINE BEDBOUND: NO
MOBILITY SCORE: 24
DAILY ACTIVITIY SCORE: 24

## 2024-05-23 ASSESSMENT — ENCOUNTER SYMPTOMS
HEMATOLOGIC/LYMPHATIC NEGATIVE: 1
ENDOCRINE NEGATIVE: 1
RESPIRATORY NEGATIVE: 1
MYALGIAS: 1
FEVER: 1
ALLERGIC/IMMUNOLOGIC NEGATIVE: 1
EYES NEGATIVE: 1
CONSTIPATION: 1
NEUROLOGICAL NEGATIVE: 1
BACK PAIN: 1
APPETITE CHANGE: 1
ABDOMINAL PAIN: 1
NAUSEA: 1
PSYCHIATRIC NEGATIVE: 1
ABDOMINAL DISTENTION: 1
CARDIOVASCULAR NEGATIVE: 1

## 2024-05-23 ASSESSMENT — ACTIVITIES OF DAILY LIVING (ADL)
ADEQUATE_TO_COMPLETE_ADL: YES
PATIENT'S MEMORY ADEQUATE TO SAFELY COMPLETE DAILY ACTIVITIES?: YES
LACK_OF_TRANSPORTATION: NO
TOILETING: INDEPENDENT
GROOMING: INDEPENDENT
BATHING: INDEPENDENT
LACK_OF_TRANSPORTATION: NO
HEARING - LEFT EAR: FUNCTIONAL
DRESSING YOURSELF: INDEPENDENT
FEEDING YOURSELF: INDEPENDENT
JUDGMENT_ADEQUATE_SAFELY_COMPLETE_DAILY_ACTIVITIES: YES
WALKS IN HOME: INDEPENDENT
HEARING - RIGHT EAR: FUNCTIONAL

## 2024-05-23 ASSESSMENT — PAIN - FUNCTIONAL ASSESSMENT
PAIN_FUNCTIONAL_ASSESSMENT: 0-10

## 2024-05-23 ASSESSMENT — PAIN DESCRIPTION - LOCATION: LOCATION: ABDOMEN

## 2024-05-23 ASSESSMENT — LIFESTYLE VARIABLES
HOW OFTEN DO YOU HAVE A DRINK CONTAINING ALCOHOL: NEVER
AUDIT-C TOTAL SCORE: 0
SKIP TO QUESTIONS 9-10: 1
HOW OFTEN DO YOU HAVE 6 OR MORE DRINKS ON ONE OCCASION: NEVER
HOW MANY STANDARD DRINKS CONTAINING ALCOHOL DO YOU HAVE ON A TYPICAL DAY: PATIENT DOES NOT DRINK
AUDIT-C TOTAL SCORE: 0

## 2024-05-23 ASSESSMENT — PAIN SCALES - GENERAL
PAINLEVEL_OUTOF10: 0 - NO PAIN
PAINLEVEL_OUTOF10: 4
PAINLEVEL_OUTOF10: 0 - NO PAIN

## 2024-05-23 ASSESSMENT — COLUMBIA-SUICIDE SEVERITY RATING SCALE - C-SSRS
2. HAVE YOU ACTUALLY HAD ANY THOUGHTS OF KILLING YOURSELF?: NO
1. IN THE PAST MONTH, HAVE YOU WISHED YOU WERE DEAD OR WISHED YOU COULD GO TO SLEEP AND NOT WAKE UP?: NO
6. HAVE YOU EVER DONE ANYTHING, STARTED TO DO ANYTHING, OR PREPARED TO DO ANYTHING TO END YOUR LIFE?: NO

## 2024-05-23 ASSESSMENT — PATIENT HEALTH QUESTIONNAIRE - PHQ9
SUM OF ALL RESPONSES TO PHQ9 QUESTIONS 1 & 2: 0
2. FEELING DOWN, DEPRESSED OR HOPELESS: NOT AT ALL
1. LITTLE INTEREST OR PLEASURE IN DOING THINGS: NOT AT ALL

## 2024-05-23 NOTE — PROGRESS NOTES
05/23/24 1538   Discharge Planning   Living Arrangements Spouse/significant other   Support Systems Spouse/significant other   Assistance Needed Independent in ADL's and iADL's. DME: grab bars in shower.   Type of Residence Private residence  (ranch style single family home with a basement. Everything is on the main floor, so he and his wife do not go downstairs.)   Number of Stairs to Enter Residence 0   Number of Stairs Within Residence 12   Do you have animals or pets at home? No   Who is requesting discharge planning? Provider   Home or Post Acute Services None   Patient expects to be discharged to: Home no needs.   Does the patient need discharge transport arranged? No  (Patient drives without difficulty. His wife will pick him up when he is medically ready for discharge.)   Financial Resource Strain   How hard is it for you to pay for the very basics like food, housing, medical care, and heating? Not hard   Housing Stability   In the last 12 months, was there a time when you were not able to pay the mortgage or rent on time? N   In the last 12 months, how many places have you lived? 1   In the last 12 months, was there a time when you did not have a steady place to sleep or slept in a shelter (including now)? N   Transportation Needs   In the past 12 months, has lack of transportation kept you from medical appointments or from getting medications? no   In the past 12 months, has lack of transportation kept you from meetings, work, or from getting things needed for daily living? No     Patient evaluated at bedside. AAOX3. Patient denies any falls within the past 6 months. PCP: Jon Mcintyre MD last seen 11/2/23. He states that he has an appointment with him in June.  Pharmacy: [facility] in [place]. Patient self manages their home medications directly from the medication bottles without difficulty, and denies issues with affordability. Patient denies any need for further assistance after discharge home. They  feel comfortable going home when medically ready. Will continue to follow for any transition care needs or changes in current plan.    Discharge plan: Home no needs.  DC Secure

## 2024-05-23 NOTE — ED PROVIDER NOTES
HPI   Chief Complaint   Patient presents with    Abdominal Pain     C/o abdominal pain x2-3 days. Was constipated 2 days ago but took milk of magnesia which helped. No BM since 2 days ago       83-year-old male comes the ED with complaint of generalized abdominal pain and bloating for the last several days.  Patient reports he is dealing with some recurrent constipation prior to this and seem to have resolved after taking some over-the-counter medications however afterwards he began having recurrence of his abdominal pain and continued to progress.  Patient reports he began having some nausea and told his wife who brought him to the ED for evaluation.  Patient in the ED is alert, cooperative, appears anxious, uncomfortable, but in no distress.  Patient describes the pain as bloatedness/sharpness and rates it a 6/10.  Patient currently denies headache, neck pain, chest pain, back pain, shortness of breath, fevers, falls, recent travel, sick contacts, vomiting/diarrhea, dysuria, dizziness, and weakness.      History provided by:  Patient, spouse and medical records   used: No                        [place] Coma Scale Score: 15                     Patient History   Past Medical History:   Diagnosis Date    Chronic lumbar radiculopathy 08/28/2023    Gastro-esophageal reflux disease without esophagitis 08/28/2023    History of prostate cancer 11/02/2023    '99 prostectomy     Impaired fasting blood sugar 11/02/2023    Neuropathy 08/28/2023    Back related    Personal history of malignant neoplasm of prostate     History of malignant neoplasm of prostate    Spinal stenosis 08/28/2023    Vitamin D deficiency 08/28/2023     Past Surgical History:   Procedure Laterality Date    CARPAL TUNNEL RELEASE      CT GUIDED PERCUTANEOUS BIOPSY BONE DEEP  12/09/2020    CT GUIDED PERCUTANEOUS BIOPSY BONE DEEP 12/9/2020 [facility]    OTHER SURGICAL HISTORY  12/01/2020    Lumbar laminectomy 9/20      OTHER SURGICAL HISTORY  12/01/2020    Spinal instrumentation placement    OTHER SURGICAL HISTORY  05/03/2022    Prostate surgery     Family History   Problem Relation Name Age of Onset    Cancer Mother       Social History     Tobacco Use    Smoking status: Never     Passive exposure: Never    Smokeless tobacco: Never   Vaping Use    Vaping status: Never Used   Substance Use Topics    Alcohol use: Never    Drug use: Never       Physical Exam   ED Triage Vitals [05/23/24 0351]   Temperature Heart Rate Respirations BP   37.1 °C (98.8 °F) 76 16 138/63      Pulse Ox Temp Source Heart Rate Source Patient Position   95 % Temporal Monitor Sitting      BP Location FiO2 (%)     Right arm --       Physical Exam  Vitals and nursing note reviewed.   Constitutional:       General: He is not in acute distress.     Appearance: He is well-developed.   HENT:      Head: Normocephalic and atraumatic.   Eyes:      Conjunctiva/sclera: Conjunctivae normal.   Cardiovascular:      Rate and Rhythm: Normal rate and regular rhythm.      Pulses: Normal pulses.      Heart sounds: Normal heart sounds, S1 normal and S2 normal. No murmur heard.  Pulmonary:      Effort: Pulmonary effort is normal. No respiratory distress.      Breath sounds: Normal breath sounds.   Abdominal:      General: Abdomen is flat.      Palpations: Abdomen is soft.      Tenderness: There is generalized abdominal tenderness.       Musculoskeletal:         General: No swelling.      Cervical back: Neck supple.   Skin:     General: Skin is warm and dry.      Capillary Refill: Capillary refill takes less than 2 seconds.   Neurological:      Mental Status: He is alert.   Psychiatric:         Mood and Affect: Mood normal.         ED Course & MDM   Diagnoses as of 05/23/24 0612   Acute pancreatitis, unspecified complication status, unspecified pancreatitis type ([facility]-HCC)   Hypokalemia   Gastritis and duodenitis     Labs Reviewed   CBC WITH AUTO DIFFERENTIAL -  Abnormal       Result Value    WBC 14.4 (*)     nRBC 0.0      RBC 5.06      Hemoglobin 14.5      Hematocrit 45.4      MCV 90      MCH 28.7      MCHC 31.9 (*)     RDW 13.6      Platelets 228      Neutrophils % 80.7      Immature Granulocytes %, Automated 0.3      Lymphocytes % 8.7      Monocytes % 9.3      Eosinophils % 0.8      Basophils % 0.2      Neutrophils Absolute 11.61 (*)     Immature Granulocytes Absolute, Automated 0.04      Lymphocytes Absolute 1.25      Monocytes Absolute 1.34 (*)     Eosinophils Absolute 0.12      Basophils Absolute 0.03     COMPREHENSIVE METABOLIC PANEL - Abnormal    Glucose 87      Sodium 134 (*)     Potassium 3.1 (*)     Chloride 95 (*)     Bicarbonate 30      Anion Gap 12      Urea Nitrogen 18      Creatinine 1.15      eGFR 63      Calcium 9.5      Albumin 4.1      Alkaline Phosphatase 50      Total Protein 7.4      AST 20      Bilirubin, Total 1.1      ALT 16     LIPASE - Normal    Lipase 66      Narrative:     Venipuncture immediately after or during the administration of Metamizole may lead to falsely low results. Testing should be performed immediately prior to Metamizole dosing.     CT abdomen pelvis w IV contrast   Final Result   Peripancreatic edema about head and uncinate process compatible with   acute pancreatitis. Focal region of hypoattenuation in the right   aspect of the pancreatic head (series 201, images 60-62 is suspicious   for a small focus of necrosis. Alternatively, this finding could   relate to preferential deposition of pancreatic fat in the head and   uncinate process. No pancreatic ductal dilation or focal fluid   collection.        Mural thickening and submucosal edema in the gastric antrum, 1st and   2nd portions of the duodenal, compatible with gastroduodenitis.        Additional findings as discussed above.        MACRO:   None        Signed by: Juma Durham 5/23/2024 4:53 AM   Dictation workstation:   [id number]          Medical Decision Making  Patient  upon arrival to the ED appeared to be anxious and uncomfortable but in no distress stable vital signs.  Discussed with patient/family presenting complaints and clinical findings.  Reviewed with them patient's epic chart and counseled them on abdominal pain and appropriate approach to management/treatments.  After assessment and evaluation IV fluids started, labs sent, imaging ordered, given IV Zofran, IV Toradol, placed on cardiac monitor, and observed.  After treatment and a period of rest patient was reassessed finally feeling a little better, no new physical complaints, vital signs stable, and final results were reviewed/discussed with patient/family.  At this time discussion was had with them regarding concern for need for admission for continued treatment and evaluation of the findings and patient was agreeable with plan of care.  Case discussed with on-call general medicine at [facility] and after discussion was agreed patient would be admitted for again further treatment of the acute pancreatitis and duodenitis.  Patient stable and transferred to the floor.    Amount and/or Complexity of Data Reviewed  External Data Reviewed: labs, radiology and notes.  Labs: ordered. Decision-making details documented in ED Course.  Radiology: ordered. Decision-making details documented in ED Course.    Risk  Decision regarding hospitalization.        Procedure  Procedures     Jatinder Macario MD  05/23/24 0615

## 2024-05-23 NOTE — H&P
History Of Present Illness  Jonathan Campos is a 83 y.o. male with PMH of HTN, dyslipidemia, prostate cancer s/p radical prostectomy in 1999, L-spine decompression and fusion 2020, GERD who presented to ED last night with c/o abdominal pain and bloating that persisted for 2-3 days. He also endorsed recent constipation and nausea. In ED his workup included a CT a/p that showed peripancreatic edema about head and uncinate compatible with pancreatitis without ductal dilation or fluid collection, as well as gastroduodenitis. He was given IV fluids, zofran and toradol which improved his pain and nausea and he was admitted for further evaluation and treatment.     Past Medical History  Past Medical History:   Diagnosis Date    Chronic lumbar radiculopathy 08/28/2023    Gastro-esophageal reflux disease without esophagitis 08/28/2023    History of prostate cancer 11/02/2023    Impaired fasting blood sugar 11/02/2023    Neuropathy 08/28/2023    Back related    Personal history of malignant neoplasm of prostate     Spinal stenosis 08/28/2023    Vitamin D deficiency 08/28/2023     Surgical History  Past Surgical History:   Procedure Laterality Date    CARPAL TUNNEL RELEASE      CT GUIDED PERCUTANEOUS BIOPSY BONE DEEP  12/09/2020    CT GUIDED PERCUTANEOUS BIOPSY BONE DEEP 12/9/2020 [facility]    LUMBAR LAMINECTOMY      PROSTATECTOMY  1999      Social History  He reports that he has never smoked. He has never been exposed to tobacco smoke. He has never used smokeless tobacco. He reports that he does not drink alcohol and does not use drugs.    Family History  Family History   Problem Relation Name Age of Onset    Cancer Mother        Allergies  Carisoprodol, Pravastatin sodium, and Simvastatin    Review of Systems   Constitutional:  Positive for appetite change and fever.   HENT: Negative.     Eyes: Negative.    Respiratory: Negative.     Cardiovascular: Negative.    Gastrointestinal:  Positive for abdominal distention,  abdominal pain, constipation and nausea.   Endocrine: Negative.    Genitourinary: Negative.    Musculoskeletal:  Positive for back pain and myalgias.   Skin: Negative.    Allergic/Immunologic: Negative.    Neurological: Negative.    Hematological: Negative.    Psychiatric/Behavioral: Negative.          Physical Exam  Constitutional:       General: He is not in acute distress.     Appearance: Normal appearance. He is not toxic-appearing.   HENT:      Head: Normocephalic and atraumatic.      Mouth/Throat:      Mouth: Mucous membranes are moist.   Eyes:      Extraocular Movements: Extraocular movements intact.      Pupils: Pupils are equal, round, and reactive to light.   Cardiovascular:      Rate and Rhythm: Normal rate and regular rhythm.      Pulses: Normal pulses.      Heart sounds: Normal heart sounds. No murmur heard.     No gallop.   Pulmonary:      Effort: Pulmonary effort is normal. No respiratory distress.      Breath sounds: Normal breath sounds. No wheezing, rhonchi or rales.   Abdominal:      General: Bowel sounds are normal. There is distension.      Palpations: Abdomen is soft.      Tenderness: There is abdominal tenderness. There is no guarding or rebound.   Musculoskeletal:         General: No swelling, tenderness, deformity or signs of injury. Normal range of motion.      Cervical back: Normal range of motion and neck supple.   Skin:     General: Skin is warm and dry.      Capillary Refill: Capillary refill takes less than 2 seconds.      Coloration: Skin is not jaundiced.      Findings: No bruising or rash.   Neurological:      General: No focal deficit present.      Mental Status: He is alert and oriented to person, place, and time.      Cranial Nerves: No cranial nerve deficit.      Sensory: No sensory deficit.      Motor: No weakness.      Gait: Gait normal.   Psychiatric:         Mood and Affect: Mood normal.         Behavior: Behavior normal.         Thought Content: Thought content normal.     "     Judgment: Judgment normal.        Last Recorded Vitals  Blood pressure 117/60, pulse 73, temperature 37.1 °C (98.8 °F), temperature source Temporal, resp. rate 18, height 1.676 m (5' 6\"), weight 75.3 kg (166 lb), SpO2 (!) 92%.    Relevant Results  CT abdomen pelvis w IV contrast  Result Date: 5/23/2024  Peripancreatic edema about head and uncinate process compatible with acute pancreatitis. Focal region of hypoattenuation in the right aspect of the pancreatic head (series 201, images 60-62 is suspicious for a small focus of necrosis. Alternatively, this finding could relate to preferential deposition of pancreatic fat in the head and uncinate process. No pancreatic ductal dilation or focal fluid collection.   Mural thickening and submucosal edema in the gastric antrum, 1st and 2nd portions of the duodenal, compatible with gastroduodenitis.   Additional findings as discussed above.   MACRO: None   Signed by: Juma Durham 5/23/2024 4:53 AM Dictation workstation:   [id number]      Latest Reference Range & Units 05/23/24 03:57   GLUCOSE 74 - 99 mg/dL 87   SODIUM 136 - 145 mmol/L 134 (L)   POTASSIUM 3.5 - 5.3 mmol/L 3.1 (L)   CHLORIDE 98 - 107 mmol/L 95 (L)   Bicarbonate 21 - 32 mmol/L 30   Anion Gap 10 - 20 mmol/L 12   Blood Urea Nitrogen 6 - 23 mg/dL 18   Creatinine 0.50 - 1.30 mg/dL 1.15   EGFR >60 mL/min/1.73m*2 63   Calcium 8.6 - 10.3 mg/dL 9.5   Albumin 3.4 - 5.0 g/dL 4.1   Alkaline Phosphatase 33 - 136 U/L 50   ALT 10 - 52 U/L 16   AST 9 - 39 U/L 20   Bilirubin Total 0.0 - 1.2 mg/dL 1.1   Total Protein 6.4 - 8.2 g/dL 7.4   LIPASE 9 - 82 U/L 66   LEUKOCYTES (10*3/UL) IN BLOOD BY AUTOMATED COUNT, [place] 4.4 - 11.3 x10*3/uL 14.4 (H)   nRBC 0.0 - 0.0 /100 WBCs 0.0   ERYTHROCYTES (10*6/UL) IN BLOOD BY AUTOMATED COUNT, [place] 4.50 - 5.90 x10*6/uL 5.06   HEMOGLOBIN 13.5 - 17.5 g/dL 14.5   HEMATOCRIT 41.0 - 52.0 % 45.4   MCV 80 - 100 fL 90   MCH 26.0 - 34.0 pg 28.7   MCHC 32.0 - 36.0 g/dL 31.9 (L)   RED CELL " DISTRIBUTION WIDTH 11.5 - 14.5 % 13.6   PLATELETS (10*3/UL) IN BLOOD AUTOMATED COUNT, JEANE 150 - 450 x10*3/uL 228      Assessment/Plan   Principal Problem:    Acute pancreatitis without infection or necrosis, unspecified pancreatitis type ([facility])  Active Problems:    Benign hypertension    Chronic lumbar radiculopathy    Gastro-esophageal reflux disease without esophagitis    Mixed hyperlipidemia    Chronic rhinitis    Acute pancreatitis without infection or necrosis, unspecified pancreatitis type ([facility])  - CT a/p: Peripancreatic edema about head and uncinate process compatible with acute pancreatitis. Focal region of hypoattenuation in the right aspect of the pancreatic head (series 201, images 60-62 is suspicious for a small focus of necrosis. Alternatively, this finding could relate to preferential deposition of pancreatic fat in the head and uncinate process. No pancreatic ductal dilation or focal fluid collection.   Mural thickening and submucosal edema in the gastric antrum, 1st and 2nd portions of the duodenal, compatible with gastroduodenitis.  - WBC 14.4  - afebrile  - lipase 66  - LFTs: WNL  - NPO, ADAT  - pain control with PRN meds  - anti-emetics  - follow CBC  - MRCP pending    Benign hypertension  Mixed hyperlipidemia  - continue atenolol-chlorthalidone, atorvastatin  - Monitor BP, HR    Gastro-esophageal reflux disease without esophagitis  - continue PPI    Hypokalemia  - K 3.1  - repleted  - trend BMP  - replete as needed    Chronic lumbar radiculopathy  - pain control as needed    Chronic rhinitis  - continue azelastine, fluticasone    GI ppx: PPI  DVT ppx: lovenox  Fluids: NS  Electrolytes: replace as needed  Nutrition: clears, ADAT  Adjuncts: PIV    Code Status: full code  Pt requires inpatient stay at this time.  Total accumulated time spent face to face and not face to face preparing to see the patient, obtaining and reviewing separately obtained history; performing a medically  appropriate examination and/or evaluation; counseling and educating the patient, family; ordering medications, tests, or procedures; referring and communicating with other health care professionals; documenting clinical information in the patient's medical record; independently interpreting results and communicating the results to the patient, family; and care coordination was 45 minutes.    Shalini Gonzalez, APRN-CNP

## 2024-05-23 NOTE — CARE PLAN
The clinical goals for the shift include Ambulate without pain    Upon arrival to the unit at 11:30am patient was oriented to the room. Since arrival the patient has not reported any pain, and vitals signs remained stable. Patient ambulated to the bathroom with staff standing by to assist with IV pole. Family was at bedside this afternoon. Patient currently in bed with call light in reach.

## 2024-05-23 NOTE — DISCHARGE INSTR - OTHER ORDERS
Thank you for choosing [facility] for your Health Care needs. As you transition from the hospital back to home, we hope we took your preferences into account on how you manage your health needs so you can manage your health at home.     You may receive a survey in the mail within the next couple weeks. Please take the time to complete it and return it. Your input is ALWAYS important to us. Thank you!  Your Care Transition Team - [facility] [employer] - [phone]    For questions about your medications listed on your discharge instructions, please call the Nurses Station at [phone].

## 2024-05-23 NOTE — CARE PLAN
Admitted with pancreatitis. Stated he was bloated for a few days and constipated. Took over the counter Milk of  Magnesium  . Cleared him out but the symptoms continued.

## 2024-05-24 VITALS
SYSTOLIC BLOOD PRESSURE: 119 MMHG | WEIGHT: 160.27 LBS | HEART RATE: 65 BPM | HEIGHT: 66 IN | RESPIRATION RATE: 16 BRPM | TEMPERATURE: 96.1 F | DIASTOLIC BLOOD PRESSURE: 67 MMHG | OXYGEN SATURATION: 95 % | BODY MASS INDEX: 25.76 KG/M2

## 2024-05-24 LAB
ANION GAP SERPL CALC-SCNC: 10 MMOL/L (ref 10–20)
BUN SERPL-MCNC: 15 MG/DL (ref 6–23)
CALCIUM SERPL-MCNC: 8.4 MG/DL (ref 8.6–10.3)
CHLORIDE SERPL-SCNC: 103 MMOL/L (ref 98–107)
CO2 SERPL-SCNC: 27 MMOL/L (ref 21–32)
CREAT SERPL-MCNC: 1 MG/DL (ref 0.5–1.3)
EGFRCR SERPLBLD CKD-EPI 2021: 74 ML/MIN/1.73M*2
ERYTHROCYTE [DISTWIDTH] IN BLOOD BY AUTOMATED COUNT: 13.5 % (ref 11.5–14.5)
GLUCOSE SERPL-MCNC: 78 MG/DL (ref 74–99)
HCT VFR BLD AUTO: 39.3 % (ref 41–52)
HGB BLD-MCNC: 12.5 G/DL (ref 13.5–17.5)
MCH RBC QN AUTO: 28.7 PG (ref 26–34)
MCHC RBC AUTO-ENTMCNC: 31.8 G/DL (ref 32–36)
MCV RBC AUTO: 90 FL (ref 80–100)
NRBC BLD-RTO: 0 /100 WBCS (ref 0–0)
PLATELET # BLD AUTO: 199 X10*3/UL (ref 150–450)
POTASSIUM SERPL-SCNC: 3.4 MMOL/L (ref 3.5–5.3)
RBC # BLD AUTO: 4.36 X10*6/UL (ref 4.5–5.9)
SODIUM SERPL-SCNC: 137 MMOL/L (ref 136–145)
WBC # BLD AUTO: 6.4 X10*3/UL (ref 4.4–11.3)

## 2024-05-24 PROCEDURE — G0378 HOSPITAL OBSERVATION PER HR: HCPCS

## 2024-05-24 PROCEDURE — 85027 COMPLETE CBC AUTOMATED: CPT | Performed by: NURSE PRACTITIONER

## 2024-05-24 PROCEDURE — 2500000001 HC RX 250 WO HCPCS SELF ADMINISTERED DRUGS (ALT 637 FOR MEDICARE OP): Performed by: NURSE PRACTITIONER

## 2024-05-24 PROCEDURE — 80048 BASIC METABOLIC PNL TOTAL CA: CPT | Performed by: NURSE PRACTITIONER

## 2024-05-24 PROCEDURE — 2500000004 HC RX 250 GENERAL PHARMACY W/ HCPCS (ALT 636 FOR OP/ED): Performed by: NURSE PRACTITIONER

## 2024-05-24 PROCEDURE — 36415 COLL VENOUS BLD VENIPUNCTURE: CPT | Performed by: NURSE PRACTITIONER

## 2024-05-24 RX ORDER — ATENOLOL 25 MG/1
12.5 TABLET ORAL DAILY
Status: DISCONTINUED | OUTPATIENT
Start: 2024-05-25 | End: 2024-05-24 | Stop reason: HOSPADM

## 2024-05-24 RX ADMIN — PANTOPRAZOLE SODIUM 40 MG: 40 TABLET, DELAYED RELEASE ORAL at 06:10

## 2024-05-24 RX ADMIN — Medication 2000 UNITS: at 09:42

## 2024-05-24 RX ADMIN — SODIUM CHLORIDE 100 ML/HR: 9 INJECTION, SOLUTION INTRAVENOUS at 09:43

## 2024-05-24 RX ADMIN — CYANOCOBALAMIN TAB 1000 MCG 1000 MCG: 1000 TAB at 09:42

## 2024-05-24 RX ADMIN — AZELASTINE HYDROCHLORIDE 1 SPRAY: 137 SPRAY, METERED NASAL at 09:42

## 2024-05-24 ASSESSMENT — PAIN SCALES - GENERAL: PAINLEVEL_OUTOF10: 0 - NO PAIN

## 2024-05-24 NOTE — PROGRESS NOTES
Patient is medically ready for discharge if he is able to tolerate his diet. Patient follow up information is on discharge instructions. Patient will be returning home. Patient is in agreement with discharge plan.    Discharge plan: Home no needs.  [place] [facility]

## 2024-05-24 NOTE — CONSULTS
Nutrition Education Note      Reason for Assessment  Reason for Assessment: Dietitian discretion (Self-referral for Pancreatitis Diet Education.)    History:  Food and Nutrient History  Food and Nutrient History: Visited Jonathan in room, he is sitting up at side of bed awaiting Low Fiber meal.  Pt reports good appetite and intake, says usual diet consists of cereal, 3 prunes and a donut for breakfast, soups, meat, potatoes.  Says he grows his own vegetables and potatoes.  Reviewed pancreatitis diet education.  Encouraged low fat diet, avoid greasy or fried foods and foods high in fat.  Printed Pancreatitis and Pancreatitis label reading diet education from AND provided.  Pt ate 100% of Low Fiber lunch today.  No edema and no skin breakdown documented on nursing flowsheet.                 Education Documentation  Nutrition Care Manual, taught by Yaneth Mcgrath, RD, LD at 5/24/2024 10:40 AM.  Learner: Patient  Readiness: Acceptance  Method: Explanation, Handout  Response: Verbalizes Understanding  Comment: Reviewed pancreatitis diet education. Encouraged low fat diet, avoid greasy or fried foods.  Printed Pancreatitis and Pancreatitis label reading diet educaiton from AND provided.               Follow Up  Time Spent (min): 20 minutes  Follow up: Provided inpatient RDN contact information  Last Date of Nutrition Visit: 05/24/24  Nutrition Follow-Up Needed?: Dietitian to reassess per policy  Follow up Comment: Pancreatitis Diet Education

## 2024-05-24 NOTE — CARE PLAN
The clinical goals for the shift include Patient will continue to tolerate IV fluids this shift    Over the shift, the patient tolerated fluids well, vitals remained stable, no reports of pain. Patient's diet was advanced to low fiber which was tolerated well with no pain or nausea. Discharge paperwork reviewed with patient who verbalized understanding, IV removed and last set of vitals taken. Patient discharged to home at 14:46.

## 2024-05-24 NOTE — NURSING NOTE
RN attempted to call patient's wife as patient was concerned of atenolol dose ordered versus what was prescribed to take at home. No answer when RN attempted to call to clarify-this information will be communicated on in bedside report.

## 2024-05-24 NOTE — DISCHARGE SUMMARY
"Discharge Diagnosis  Acute pancreatitis without infection or necrosis, unspecified pancreatitis type ([facility])    Discharge Meds     Your medication list        CONTINUE taking these medications        Instructions Last Dose Given Next Dose Due   atenoloL-chlorthalidone 100-25 mg tablet  Commonly known as: Tenoretic           atorvastatin 10 mg tablet  Commonly known as: Lipitor      Take 1 tablet (10 mg) by mouth once daily.       azelastine 137 mcg (0.1 %) nasal spray  Commonly known as: Astelin           cholecalciferol 25 MCG (1000 UT) capsule  Commonly known as: Vitamin D-3           fluticasone 50 mcg/actuation nasal spray  Commonly known as: Flonase           PreserVision AREDS 4,296 mcg-226 mg-90 mg capsule  Generic drug: vitamins A,C,E-zinc-copper           Tylenol Arthritis Pain 650 mg ER tablet  Generic drug: acetaminophen           acetaminophen 650 mg ER tablet  Commonly known as: Tylenol 8 HOUR           Vitamin B-12 1,000 mcg tablet  Generic drug: cyanocobalamin                    Test Results Pending At Discharge  Pending Labs       No current pending labs.            Hospital Course   Jonathan Campos is a 83 y.o. male with PMH of HTN, dyslipidemia, prostate cancer s/p radical prostectomy in 1999, L-spine decompression and fusion 2020, GERD who was admitted with pancreatitis after presented to ED with epigastric pain and bloating for 2-3 days. His CT A/P showed peripancreatic edema about head and uncinate compatible with pancreatitis without ductal dilation or fluid collection, as well as gastroduodenitis. He was treated with IVF and tylenol for pain. He was not experiencing nausea, vomiting or severe pain. He was able to tolerate clears and advance his diet to soft food without issue. MRI then showed \"focal acute interstitial edematous pancreatitis involving the head, no evidence of necrosis. 10 mm cystic focus adjacent to the inflammatory changes could reflect presents a cystic lesion or " "pseudocyst, which was not present on prior MRI in 2021. Scattered ductal side branches could be the sequela of chronic pancreatitis. No biliary tract stone or obstruction identified.\" He was discharged home today with recommendation to follow up with GI, and remain on a bland diet. He only wanted to use tylenol for pain.     Problem list:  Acute pancreatitis without infection or necrosis, unspecified pancreatitis type ([facility])  - CT a/p: Peripancreatic edema about head and uncinate process compatible with acute pancreatitis. Focal region of hypoattenuation in the right aspect of the pancreatic head (series 201, images 60-62 is suspicious for a small focus of necrosis. Alternatively, this finding could relate to preferential deposition of pancreatic fat in the head and uncinate process. No pancreatic ductal dilation or focal fluid collection.   Mural thickening and submucosal edema in the gastric antrum, 1st and 2nd portions of the duodenal, compatible with gastroduodenitis.  - WBC 14.4> 6.4  - afebrile  - lipase 66  - LFTs: WNL  - NPO, ADAT  - pain control with PRN meds  - anti-emetics  - follow CBC  - MRCP: 1.  Focal acute interstitial edematous pancreatitis involving the head. No evidence of necrosis. 10 mm cystic focus adjacent to the  inflammatory changes could reflect presents a cystic lesion or pseudocyst, which was not present on prior MRI in 2021. Scattered  ductal side branches could be the sequela of chronic pancreatitis. 2. No biliary tract stone or obstruction identified.  ---DC home with bland diet  ---follow up with GI    Benign hypertension  Mixed hyperlipidemia  Gastro-esophageal reflux disease without esophagitis  Chronic rhinitis  ---Continue all chronic meds  ---Follow up as needed with PCP     Hypokalemia  - K 3.1  - repleted  - trend BMP  - replete as needed  ---stable     Chronic lumbar radiculopathy  - pain control as needed  ---stable    Pertinent Physical Exam At Time of Discharge  Physical " Exam  Constitutional:       General: He is not in acute distress.     Appearance: Normal appearance. He is not toxic-appearing.   HENT:      Head: Normocephalic and atraumatic.      Mouth/Throat:      Mouth: Mucous membranes are moist.   Eyes:      Extraocular Movements: Extraocular movements intact.      Pupils: Pupils are equal, round, and reactive to light.   Cardiovascular:      Rate and Rhythm: Normal rate and regular rhythm.      Pulses: Normal pulses.      Heart sounds: Normal heart sounds. No murmur heard.     No gallop.   Pulmonary:      Effort: Pulmonary effort is normal. No respiratory distress.      Breath sounds: Normal breath sounds. No wheezing, rhonchi or rales.   Abdominal:      General: Bowel sounds are normal. There is distension.      Palpations: Abdomen is soft.      Tenderness: There is abdominal tenderness. There is no guarding or rebound.   Musculoskeletal:         General: No swelling, tenderness, deformity or signs of injury. Normal range of motion.      Cervical back: Normal range of motion and neck supple.   Skin:     General: Skin is warm and dry.      Capillary Refill: Capillary refill takes less than 2 seconds.      Coloration: Skin is not jaundiced.      Findings: No bruising or rash.   Neurological:      General: No focal deficit present.      Mental Status: He is alert and oriented to person, place, and time.      Cranial Nerves: No cranial nerve deficit.      Sensory: No sensory deficit.      Motor: No weakness.      Gait: Gait normal.   Psychiatric:         Mood and Affect: Mood normal.         Behavior: Behavior normal.         Thought Content: Thought content normal.         Judgment: Judgment normal.     Stable for discharge.  Total cumulative time spent in preparation of this discharge including documentation review, coordination of care with the medical team including PT/SW/care coordinators and treating consultants, discussion with patient and pertinent family members and  finalization of prescriptions, follow-up appointments, and this discharge summary was approximately 45 minutes.       Outpatient Follow-Up  Future Appointments   Date Time Provider Department Center   6/4/2024  1:00 PM Jon Mcintyre MD [id number] [facility]   8/13/2024 10:30 AM Richard David MD [id number] [facility]         Shalini Gonzalez, APRN-CNP

## 2024-05-24 NOTE — CARE PLAN
The clinical goals for the shift include patient to report controlled pain overnight    Over the shift, the patient did not make progress toward the following goals. Patient remained free of pain overnight. Patient remains on IVF. Clear liquid diet maintained.     Problem: Discharge Barriers  Goal: My discharge needs are met  Outcome: Not Progressing

## 2024-05-27 ENCOUNTER — DOCUMENTATION (OUTPATIENT)
Dept: PRIMARY CARE | Facility: CLINIC | Age: 84
End: 2024-05-27
Payer: MEDICARE

## 2024-05-28 ENCOUNTER — PATIENT OUTREACH (OUTPATIENT)
Dept: PRIMARY CARE | Facility: CLINIC | Age: 84
End: 2024-05-28
Payer: MEDICARE

## 2024-05-28 ENCOUNTER — DOCUMENTATION (OUTPATIENT)
Dept: AUDIOLOGY | Facility: CLINIC | Age: 84
End: 2024-05-28
Payer: MEDICARE

## 2024-05-28 NOTE — PROGRESS NOTES
Mr. Campos came in today because the retention tails broke off hearing aids. Retention tails, wax guards and domes were all replaced and Mr. Campos will contact the office if he needs further assistance.

## 2024-05-28 NOTE — PROGRESS NOTES
Discharge Facility: [facility]     Discharge Diagnosis:      Discharge Diagnosis  Acute pancreatitis without infection or necrosis, unspecified pancreatitis type         Admission Date: 5/23/2024   Discharge Date:  5/24/2024     PCP Appointment Date: 6/4/2024     Specialist Appointment Date:     F/U Emily PERRY patient  awaiting call to [facility].     Hospital Encounter and Summary: Linked    See discharge assessment below for further details     Engagement  Call Start Time: 1310 (5/28/2024  1:10 PM)    Medications  Medications reviewed with patient/caregiver?: Yes (5/28/2024  1:10 PM)  Is the patient having any side effects they believe may be caused by any medication additions or changes?: No (5/28/2024  1:10 PM)  Does the patient have all medications ordered at discharge?: Yes (5/28/2024  1:10 PM)  Care Management Interventions: No intervention needed (5/28/2024  1:10 PM)  Prescription Comments: no change  in meds (5/28/2024  1:10 PM)  Is the patient taking all medications as directed (includes completed medication regime)?: -- (pt states following DC summary) (5/28/2024  1:10 PM)  Care Management Interventions: Provided patient education (5/28/2024  1:10 PM)  Medication Comments: no questions or concerns (5/28/2024  1:10 PM)    Appointments  Does the patient have a primary care provider?: Yes (5/28/2024  1:10 PM)  Care Management Interventions: Verified appointment date/time/provider (5/28/2024  1:10 PM)  Has the patient kept scheduled appointments due by today?: Yes (5/28/2024  1:10 PM)  Care Management Interventions: Advised to schedule with specialist (5/28/2024  1:10 PM)    Self Management  What is the home health agency?: NA (5/28/2024  1:10 PM)  What Durable Medical Equipment (DME) was ordered?: NA (5/28/2024  1:10 PM)    Patient Teaching  Does the patient have access to their discharge instructions?: Yes (5/28/2024  1:10 PM)  Care Management Interventions: Reviewed instructions with patient (5/28/2024  1:10  PM)  What is the patient's perception of their health status since discharge?: Improving (5/28/2024  1:10 PM)  Is the patient/caregiver able to teach back the hierarchy of who to call/visit for symptoms/problems? PCP, Specialist, Home Health nurse, Urgent Care, ED, 911: Yes (5/28/2024  1:10 PM)  Patient/Caregiver Education Comments: Patient aware to call providers for any questions concerns or change in condition (5/28/2024  1:10 PM)

## 2024-06-03 PROBLEM — R07.9 CHEST PAIN: Status: ACTIVE | Noted: 2022-11-28

## 2024-06-04 ENCOUNTER — OFFICE VISIT (OUTPATIENT)
Dept: PRIMARY CARE | Facility: CLINIC | Age: 84
End: 2024-06-04
Payer: MEDICARE

## 2024-06-04 VITALS
OXYGEN SATURATION: 98 % | HEART RATE: 51 BPM | SYSTOLIC BLOOD PRESSURE: 113 MMHG | BODY MASS INDEX: 25.71 KG/M2 | TEMPERATURE: 97.5 F | WEIGHT: 160 LBS | DIASTOLIC BLOOD PRESSURE: 57 MMHG | HEIGHT: 66 IN

## 2024-06-04 DIAGNOSIS — E78.00 PURE HYPERCHOLESTEROLEMIA: ICD-10-CM

## 2024-06-04 DIAGNOSIS — J84.10 POSTINFLAMMATORY PULMONARY FIBROSIS (MULTI): ICD-10-CM

## 2024-06-04 DIAGNOSIS — R73.01 IMPAIRED FASTING GLUCOSE: ICD-10-CM

## 2024-06-04 DIAGNOSIS — E55.9 VITAMIN D DEFICIENCY: ICD-10-CM

## 2024-06-04 DIAGNOSIS — Z87.19 HISTORY OF PANCREATITIS: ICD-10-CM

## 2024-06-04 DIAGNOSIS — N32.81 OVERACTIVE BLADDER: ICD-10-CM

## 2024-06-04 DIAGNOSIS — I10 BENIGN HYPERTENSION: ICD-10-CM

## 2024-06-04 DIAGNOSIS — M15.9 PRIMARY OSTEOARTHRITIS INVOLVING MULTIPLE JOINTS: ICD-10-CM

## 2024-06-04 DIAGNOSIS — Z00.00 ROUTINE GENERAL MEDICAL EXAMINATION AT HEALTH CARE FACILITY: Primary | ICD-10-CM

## 2024-06-04 PROCEDURE — 1157F ADVNC CARE PLAN IN RCRD: CPT | Performed by: INTERNAL MEDICINE

## 2024-06-04 PROCEDURE — G0439 PPPS, SUBSEQ VISIT: HCPCS | Performed by: INTERNAL MEDICINE

## 2024-06-04 PROCEDURE — 99215 OFFICE O/P EST HI 40 MIN: CPT | Performed by: INTERNAL MEDICINE

## 2024-06-04 PROCEDURE — 1126F AMNT PAIN NOTED NONE PRSNT: CPT | Performed by: INTERNAL MEDICINE

## 2024-06-04 PROCEDURE — 3078F DIAST BP <80 MM HG: CPT | Performed by: INTERNAL MEDICINE

## 2024-06-04 PROCEDURE — 1036F TOBACCO NON-USER: CPT | Performed by: INTERNAL MEDICINE

## 2024-06-04 PROCEDURE — 3074F SYST BP LT 130 MM HG: CPT | Performed by: INTERNAL MEDICINE

## 2024-06-04 PROCEDURE — 1159F MED LIST DOCD IN RCRD: CPT | Performed by: INTERNAL MEDICINE

## 2024-06-04 ASSESSMENT — PAIN SCALES - GENERAL: PAINLEVEL: 0-NO PAIN

## 2024-06-04 ASSESSMENT — ENCOUNTER SYMPTOMS
SHORTNESS OF BREATH: 0
PALPITATIONS: 0

## 2024-06-04 ASSESSMENT — PATIENT HEALTH QUESTIONNAIRE - PHQ9
SUM OF ALL RESPONSES TO PHQ9 QUESTIONS 1 AND 2: 0
SUM OF ALL RESPONSES TO PHQ9 QUESTIONS 1 AND 2: 0
1. LITTLE INTEREST OR PLEASURE IN DOING THINGS: NOT AT ALL
2. FEELING DOWN, DEPRESSED OR HOPELESS: NOT AT ALL
2. FEELING DOWN, DEPRESSED OR HOPELESS: NOT AT ALL
1. LITTLE INTEREST OR PLEASURE IN DOING THINGS: NOT AT ALL

## 2024-06-04 NOTE — PROGRESS NOTES
[facility]: [facility]  MEDICARE WELLNESS EXAM      Jonathan Campos is a 83 y.o. male that is presenting today for Follow-up (Hosp [facility]).    Assessment/Plan    Diagnoses and all orders for this visit:  Routine general medical examination at health care facility  Postinflammatory pulmonary fibrosis (Multi)  Pure hypercholesterolemia  -     Comprehensive Metabolic Panel; Future  -     Lipid Panel; Future  Benign hypertension  Comments:  BP doing OK.  Vitamin D deficiency  Impaired fasting glucose  Comments:  HGA1C 5.9% 11/23.  Orders:  -     CBC and Auto Differential; Future  -     Hemoglobin A1C; Future  Overactive bladder  History of pancreatitis  Comments:  GI CNP to see in August which is fine. Small cyst in pancreas, MRCP. No worries, no alcohol.  Primary osteoarthritis involving multiple joints  Other orders  -     Follow Up In Primary Care - Medicare Annual    ADVANCED CARE PLANNING  Advanced Care Planning was discussed with patient:  The patient has an active advanced care plan on file. The patient has an active surrogate decision-maker on file.  Encouraged the patient to confirm that Living Will and Healthcare Power of  (HCPoA) are accurate and up to date.  Encouraged the patient to confirm that our office be provided a copy of any documentation in the event that anything changes.    ACTIVITIES OF DAILY LIVING  Basic ADLs:  Bathing: Independent, Dressing: Independent, Toileting: Independent, Transferring: Independent, Continence: Independent, Feeding: Independent.    Instrumental ADLs:  Ability to use phone: Independent, Shopping: Independent, Cooking: Independent, House-keeping: Independent, Laundry: Independent, Transportation: Independent, Medication Management: Independent, Finance Management: Independent.    Subjective   Wellness visit. Over all health status doing well. Diet reviewed, Mediterranean, low sugar diet suggested. No  active depression, or little interest in  doing activites or hopelessness. Home safety reviewed, well light, no throw rugs,etc. No falls. Advanced directives filled out at home.  ADL, and instrumental ADL no limits doing well. Vision screen eye exam yearly, hearing aids using.  No cognitive decline observed. No opiod pain meds used. GI CNP follow up,  yearly. Retina specialist right eye. Stable.    Review of Systems   Respiratory:  Negative for shortness of breath.    Cardiovascular:  Negative for chest pain and palpitations.   All other systems reviewed and are negative.    Objective   Vitals:    06/04/24 1311   BP: 113/57   Pulse: 51   Temp: 36.4 °C (97.5 °F)   SpO2: 98%      Body mass index is 25.82 kg/m².  Physical Exam  Constitutional:       General: He is not in acute distress.     Appearance: He is not toxic-appearing.   HENT:      Head: Normocephalic and atraumatic.      Right Ear: Tympanic membrane and ear canal normal.      Left Ear: Tympanic membrane and ear canal normal.      Nose: Nose normal.      Mouth/Throat:      Pharynx: Oropharynx is clear.   Eyes:      Extraocular Movements: Extraocular movements intact.      Pupils: Pupils are equal, round, and reactive to light.   Cardiovascular:      Rate and Rhythm: Normal rate and regular rhythm.      Heart sounds: Normal heart sounds. No murmur heard.  Pulmonary:      Breath sounds: Normal breath sounds.   Abdominal:      General: Bowel sounds are normal. There is no distension.      Palpations: Abdomen is soft. There is no mass.      Tenderness: There is no abdominal tenderness.   Musculoskeletal:         General: No swelling or tenderness.      Right lower leg: No edema.      Left lower leg: No edema.   Skin:     General: Skin is warm and dry.   Neurological:      General: No focal deficit present.      Mental Status: He is oriented to person, place, and time.      Sensory: No sensory deficit.      Motor: No weakness.      Deep Tendon Reflexes: Reflexes normal.     Diagnostic  "Results   Lab Results   Component Value Date    GLUCOSE 78 05/24/2024    CALCIUM 8.4 (L) 05/24/2024     05/24/2024    K 3.4 (L) 05/24/2024    CO2 27 05/24/2024     05/24/2024    BUN 15 05/24/2024    CREATININE 1.00 05/24/2024     Lab Results   Component Value Date    ALT 16 05/23/2024    AST 20 05/23/2024    ALKPHOS 50 05/23/2024    BILITOT 1.1 05/23/2024     Lab Results   Component Value Date    WBC 6.4 05/24/2024    HGB 12.5 (L) 05/24/2024    HCT 39.3 (L) 05/24/2024    MCV 90 05/24/2024     05/24/2024     Lab Results   Component Value Date    CHOL 132 11/03/2023    CHOL 152 09/30/2022    CHOL 151 09/09/2021     Lab Results   Component Value Date    HDL 32.9 11/03/2023    HDL 32 (L) 09/30/2022    HDL 30 (L) 09/09/2021     Lab Results   Component Value Date    LDLCALC 79 11/03/2023    LDLCALC 94 09/30/2022    LDLCALC 93 09/09/2021     Lab Results   Component Value Date    TRIG 99 11/03/2023    TRIG 128 09/30/2022    TRIG 141 09/09/2021     No components found for: \"CHOLHDL\"  Lab Results   Component Value Date    HGBA1C 5.9 (H) 11/03/2023     Other labs not included in the list above reviewed either before or during this encounter.    History   Past Medical History:   Diagnosis Date   • Chronic lumbar radiculopathy 08/28/2023   • Gastro-esophageal reflux disease without esophagitis 08/28/2023   • History of malignant neoplasm of prostate 11/02/2023    '99 prostectomy    • History of pancreatitis 06/04/2024    GI CNP to see in August which is fine. Small cyst in pancreas, MRCP. No worries, no alcohol.   • History of prostate cancer 11/02/2023   • Impaired fasting blood sugar 11/02/2023   • Neuropathy 08/28/2023    Back related   • Personal history of malignant neoplasm of prostate    • Spinal stenosis 08/28/2023   • Vitamin D deficiency 08/28/2023     Past Surgical History:   Procedure Laterality Date   • CARPAL TUNNEL RELEASE     • CT GUIDED PERCUTANEOUS BIOPSY BONE DEEP  12/09/2020 " "   CT GUIDED PERCUTANEOUS BIOPSY BONE DEEP 12/9/2020 [facility]   • LUMBAR LAMINECTOMY     • PROSTATECTOMY  1999     Family History   Problem Relation Name Age of Onset   • Cancer Mother       Social History     Socioeconomic History   • Marital status:      Spouse name: Not on file   • Number of children: Not on file   • Years of education: Not on file   • Highest education level: Not on file   Occupational History   • Not on file   Tobacco Use   • Smoking status: Never     Passive exposure: Never   • Smokeless tobacco: Never   Vaping Use   • Vaping status: Never Used   Substance and Sexual Activity   • Alcohol use: Never   • Drug use: Never   • Sexual activity: Not on file   Other Topics Concern   • Not on file   Social History Narrative   • Not on file     Social Determinants of Health     Financial Resource Strain: Low Risk  (5/23/2024)    Overall Financial Resource Strain (CARDIA)    • Difficulty of Paying Living Expenses: Not hard at all   Food Insecurity: Not on file   Transportation Needs: No Transportation Needs (5/23/2024)    PRAPARE - Transportation    • Lack of Transportation (Medical): No    • Lack of Transportation (Non-Medical): No   Physical Activity: Not on file   Stress: Not on file   Social Connections: Not on file   Intimate Partner Violence: Not on file   Housing Stability: Low Risk  (5/23/2024)    Housing Stability Vital Sign    • Unable to Pay for Housing in the Last Year: No    • Number of Places Lived in the Last Year: 1    • Unstable Housing in the Last Year: No     Allergies   Allergen Reactions   • Carisoprodol Other, Unknown, Anaphylaxis and Rash     \"Spaced out\"   • Pravastatin Sodium Other     myalgias   • Simvastatin Other     myalgias     Current Outpatient Medications on File Prior to Visit   Medication Sig Dispense Refill   • acetaminophen (Tylenol 8 HOUR) 650 mg ER tablet Take 1 tablet (650 mg) by mouth once daily in the evening.  Take before meals. Do not crush, chew, " or split.     • acetaminophen (Tylenol Arthritis Pain) 650 mg ER tablet Take 2 tablets (1,300 mg) by mouth once daily with breakfast. Do not crush, chew, or split.     • atenoloL-chlorthalidone (Tenoretic) 100-25 mg tablet Take 0.5 tablets by mouth once daily.     • atorvastatin (Lipitor) 10 mg tablet Take 1 tablet (10 mg) by mouth once daily. 90 tablet 3   • azelastine (Astelin) 137 mcg (0.1 %) nasal spray if needed.     • cholecalciferol (Vitamin D-3) 25 MCG (1000 UT) capsule Take 1 capsule (25 mcg) by mouth once daily.     • cyanocobalamin (Vitamin B-12) 1,000 mcg tablet Take 1 tablet (1,000 mcg) by mouth once daily.     • fluticasone (Flonase) 50 mcg/actuation nasal spray Administer 1 spray into each nostril if needed for rhinitis or allergies.     • vitamins A,C,E-zinc-copper (PreserVision AREDS) 4,296 mcg-226 mg-90 mg capsule Take 1 tablet by mouth once daily.       No current facility-administered medications on file prior to visit.     Immunization History   Administered Date(s) Administered   • Flu vaccine, quadrivalent, high-dose, preservative free, age 65y+ (FLUZONE) 09/21/2020, 09/27/2021   • Influenza, High Dose Seasonal, Preservative Free 11/03/2016, 10/15/2018   • Influenza, injectable, quadrivalent 11/06/2017   • Influenza, seasonal, injectable 10/19/2010, 09/15/2011, 10/11/2012, 09/12/2013, 10/16/2014   • Moderna SARS-CoV-2 Vaccination 01/01/2021, 01/29/2021, 02/28/2021   • Pneumococcal conjugate vaccine, 13-valent (PREVNAR 13) 10/20/2015   • Pneumococcal polysaccharide vaccine, 23-valent, age 2 years and older (PNEUMOVAX 23) 12/01/2005, 11/03/2016   • Td vaccine, age 7 years and older (TDVAX) 09/01/2002   • Tdap vaccine, age 7 year and older (BOOSTRIX, ADACEL) 03/14/2013, 11/02/2023   • Zoster, live 04/16/2015     Patient's medical history was reviewed and updated either before or during this encounter.     Jon Mcintyre MD

## 2024-06-04 NOTE — PATIENT INSTRUCTIONS
Dr.Joseph Espinoza new PCP. Call now to set up October time frame.    Diagnoses and all orders for this visit:  Routine general medical examination at health care facility  Postinflammatory pulmonary fibrosis (Multi)  Pure hypercholesterolemia  -     Comprehensive Metabolic Panel; Future  -     Lipid Panel; Future  Benign hypertension  Comments:  BP doing OK.  Vitamin D deficiency  Impaired fasting glucose  Comments:  HGA1C 5.9% 11/23.  Orders:  -     CBC and Auto Differential; Future  -     Hemoglobin A1C; Future  Overactive bladder  History of pancreatitis  Comments:  GI CNP to see in August which is fine. Small cyst in pancreas, MRCP. No worries, no alcohol.  Primary osteoarthritis involving multiple joints  Other orders  -     Follow Up In Primary Care - Medicare Annual

## 2024-06-06 ENCOUNTER — PATIENT OUTREACH (OUTPATIENT)
Dept: PRIMARY CARE | Facility: CLINIC | Age: 84
End: 2024-06-06
Payer: MEDICARE

## 2024-06-06 NOTE — PROGRESS NOTES
Call regarding appt. with PCP on  6/4/2024 after hospitalization.    At time of outreach call the patient feels as if their condition has improved  since last visit.    Reviewed the PCP appointment with the pt and addressed any questions or concerns.     F/U Emily Chavez GI patient appt. 8/9/2024     Encouraged patient to call providers for any questions concerns or change in condition

## 2024-06-20 ENCOUNTER — PATIENT OUTREACH (OUTPATIENT)
Dept: PRIMARY CARE | Facility: CLINIC | Age: 84
End: 2024-06-20
Payer: MEDICARE

## 2024-06-20 NOTE — PROGRESS NOTES
Unable to reach patient for  a F/U call     LVM with call back number for patient to call if needed   If no voicemail available call attempts x 2 were made to contact the patient to assist with any questions or concerns patient may have.    L/M  Encouraged patient to call providers for any questions concerns or change in condition

## 2024-08-05 ENCOUNTER — CLINICAL SUPPORT (OUTPATIENT)
Dept: AUDIOLOGY | Facility: CLINIC | Age: 84
End: 2024-08-05

## 2024-08-05 DIAGNOSIS — H90.3 SENSORINEURAL HEARING LOSS (SNHL) OF BOTH EARS: Primary | ICD-10-CM

## 2024-08-05 NOTE — PROGRESS NOTES
HEARING AID CHECK    RIGHT: BALTAZAR ARROYO BARRON 19 #1 M  8 MM SN:  [id number]  LEFT:   BALTAZAR ARROYO BARRON 19 #1 M  8 MM SN:  [id number]  FIT DATE: 2019  WARRANTY: NONE    This patient was seen for a HAC with the complaint that   right aid dead even with clean and check today.  Otoscopy : slight wax au. Told patient to get ears cleaned by a professional.    The following programming changes were made: NONE  Changed #1 right M  and cleaned and checked both aids.      The patient paid $125.00 for today's visit.  Return in 6 months or sooner if needed. wt    APPOINTMENT TIME:  30

## 2024-08-07 NOTE — PROGRESS NOTES
History Of Present Illness  Jonathan Campos is a 84 y.o. male presenting to GI clinic with a chief complaint of hospital follow-up.  Patient was hospitalized 5/23-5/24/2024 with acute pancreatitis with surrounding gastroduodenitis.      Patient states he is having no more abdominal pain or bloating. He does get some bloating with milk, which is not abnormal for him. He does not drink alcohol and denies drug use. He still has his gallbladder. He uses Tylenol TID for back and arthritic pain.     Patient denies BRBPR, changes in bowel habits, melena, hematochezia.     Social History  He reports that he has never smoked. He has never been exposed to tobacco smoke. He has never used smokeless tobacco. He reports that he does not currently use alcohol. He reports that he does not use drugs.  He does not take NSAIDs on a regular basis    Family History  Family History   Problem Relation Name Age of Onset    Cancer Mother      Colon cancer Mother      Stroke Son       The patient does have a FH of CRC. he does not have a FH of IBD    Review of Systems   Constitutional:  Negative for unexpected weight change.   Gastrointestinal:  Positive for constipation (occasional). Negative for abdominal distention, abdominal pain, anal bleeding, blood in stool, diarrhea, nausea, rectal pain and vomiting.        See HPI   Musculoskeletal:  Positive for arthralgias and back pain.   All other systems reviewed and are negative.        Physical Exam  Constitutional:       General: He is not in acute distress.     Appearance: Normal appearance. He is not ill-appearing.   HENT:      Head: Normocephalic and atraumatic.      Mouth/Throat:      Mouth: Mucous membranes are moist.   Eyes:      General: No scleral icterus.     Conjunctiva/sclera: Conjunctivae normal.      Pupils: Pupils are equal, round, and reactive to light.   Pulmonary:      Effort: Pulmonary effort is normal.   Abdominal:      General: Bowel sounds are normal. There is no  "distension.      Palpations: Abdomen is soft. There is no mass.      Tenderness: There is no abdominal tenderness.      Hernia: No hernia is present.   Musculoskeletal:         General: Normal range of motion.      Cervical back: Normal range of motion.   Skin:     General: Skin is warm and dry.      Coloration: Skin is not jaundiced or pale.   Neurological:      Mental Status: He is alert. Mental status is at baseline.   Psychiatric:         Mood and Affect: Mood normal.         Behavior: Behavior normal.          Last Vital Signs  /67 (BP Location: Left arm, Patient Position: Sitting, BP Cuff Size: Adult)   Pulse 55   Temp 36.4 °C (97.5 °F)   Resp 16   Ht 1.676 m (5' 6\")   Wt 72.7 kg (160 lb 4.8 oz)   SpO2 98%   BMI 25.87 kg/m²      Relevant Results  Lab Results   Component Value Date    WBC 6.4 05/24/2024    HGB 12.5 (L) 05/24/2024    HCT 39.3 (L) 05/24/2024    MCV 90 05/24/2024     05/24/2024      Lab Results   Component Value Date    GLUCOSE 78 05/24/2024    CALCIUM 8.4 (L) 05/24/2024     05/24/2024    K 3.4 (L) 05/24/2024    CO2 27 05/24/2024     05/24/2024    BUN 15 05/24/2024    CREATININE 1.00 05/24/2024      Lab Results   Component Value Date    ALT 16 05/23/2024    AST 20 05/23/2024    ALKPHOS 50 05/23/2024    BILITOT 1.1 05/23/2024    BILIDIR 0.2 03/12/2021    INR 1.1 06/24/2020      Lab Results   Component Value Date    QSUKPERT64 957 (H) 11/03/2023       Lab Results   Component Value Date    CRP 2.60 (A) 01/04/2021    CRP 2.13 (A) 12/03/2020      Lab Results   Component Value Date    LIPASE 66 05/23/2024      Lab Results   Component Value Date    HGBA1C 5.9 (H) 11/03/2023        EGD/COLONOSCOPY  Colonoscopy 5/12 x 5 . Colonoscopy 5/9/17 X PRN- Dr. Carpenter    Declined screening colonoscopy     IMAGING  MRCP pancreas with and without IV contrast 5/23/2024  IMPRESSION:  1.  Focal acute interstitial edematous pancreatitis involving the head. No evidence of " necrosis. 10 mm cystic focus adjacent to the inflammatory changes could reflect presents a cystic lesion or pseudocyst, which was not present on prior MRI in 2021. Scattered ductal side branches could be the sequela of chronic pancreatitis.  2. No biliary tract stone or obstruction identified.    === 05/23/24 ===  CT ABDOMEN PELVIS W IV CONTRAST  - Impression -  Peripancreatic edema about head and uncinate process compatible with acute pancreatitis. Focal region of hypoattenuation in the right aspect of the pancreatic head (series 201, images 60-62 is suspicious for a small focus of necrosis. Alternatively, this finding could relate to preferential deposition of pancreatic fat in the head and uncinate process. No pancreatic ductal dilation or focal fluid collection.    Mural thickening and submucosal edema in the gastric antrum, 1st and 2nd portions of the duodenal, compatible with gastroduodenitis.    Additional findings as discussed above.    === 08/31/21 ===  CT ABDOMEN PELVIS WO IV CONTRAST  - Impression -  1. There is equivocal hypodensity within the uncinate process may simply relate to normal variation of the pancreatic parenchyma and interspersed fat, but underlying lesion not excluded measuring up to 9 mm. Dedicated CT pancreas protocol recommended.  2. No nephrolithiasis or ureterolithiasis demonstrated  3. Small umbilical hernia containing fat.  4. Chronic appearing basilar interstitial lung changes.    Assessment & Plan  History of pancreatitis  Recent hospitalization for acute pancreatitis. Likely acute on chronic pancreatitis. Symptoms resolved.   He is on Tylenol TID which is associated drug induced pancreatitis.        Follow up DANIELLE Chavez, APRN-CNP

## 2024-08-09 ENCOUNTER — APPOINTMENT (OUTPATIENT)
Dept: GASTROENTEROLOGY | Facility: CLINIC | Age: 84
End: 2024-08-09
Payer: MEDICARE

## 2024-08-09 VITALS
HEART RATE: 55 BPM | OXYGEN SATURATION: 98 % | TEMPERATURE: 97.5 F | HEIGHT: 66 IN | SYSTOLIC BLOOD PRESSURE: 122 MMHG | WEIGHT: 160.3 LBS | DIASTOLIC BLOOD PRESSURE: 67 MMHG | BODY MASS INDEX: 25.76 KG/M2 | RESPIRATION RATE: 16 BRPM

## 2024-08-09 DIAGNOSIS — Z87.19 HISTORY OF PANCREATITIS: Primary | ICD-10-CM

## 2024-08-09 PROCEDURE — 1159F MED LIST DOCD IN RCRD: CPT

## 2024-08-09 PROCEDURE — 1160F RVW MEDS BY RX/DR IN RCRD: CPT

## 2024-08-09 PROCEDURE — 3078F DIAST BP <80 MM HG: CPT

## 2024-08-09 PROCEDURE — 1157F ADVNC CARE PLAN IN RCRD: CPT

## 2024-08-09 PROCEDURE — 1036F TOBACCO NON-USER: CPT

## 2024-08-09 PROCEDURE — 3074F SYST BP LT 130 MM HG: CPT

## 2024-08-09 PROCEDURE — 1126F AMNT PAIN NOTED NONE PRSNT: CPT

## 2024-08-09 PROCEDURE — 99203 OFFICE O/P NEW LOW 30 MIN: CPT

## 2024-08-09 ASSESSMENT — ENCOUNTER SYMPTOMS
ARTHRALGIAS: 1
BLOOD IN STOOL: 0
VOMITING: 0
UNEXPECTED WEIGHT CHANGE: 0
NAUSEA: 0
ABDOMINAL DISTENTION: 0
ANAL BLEEDING: 0
BACK PAIN: 1
ABDOMINAL PAIN: 0
CONSTIPATION: 1
RECTAL PAIN: 0
ROS GI COMMENTS: SEE HPI
DIARRHEA: 0

## 2024-08-09 ASSESSMENT — PATIENT HEALTH QUESTIONNAIRE - PHQ9
SUM OF ALL RESPONSES TO PHQ9 QUESTIONS 1 AND 2: 0
1. LITTLE INTEREST OR PLEASURE IN DOING THINGS: NOT AT ALL
2. FEELING DOWN, DEPRESSED OR HOPELESS: NOT AT ALL

## 2024-08-09 ASSESSMENT — PAIN SCALES - GENERAL: PAINLEVEL: 0-NO PAIN

## 2024-08-09 NOTE — ASSESSMENT & PLAN NOTE
Recent hospitalization for acute pancreatitis. Likely acute on chronic pancreatitis. Symptoms resolved.   He is on Tylenol TID which is associated drug induced pancreatitis.

## 2024-08-13 ENCOUNTER — OFFICE VISIT (OUTPATIENT)
Dept: CARDIOLOGY | Facility: CLINIC | Age: 84
End: 2024-08-13
Payer: MEDICARE

## 2024-08-13 VITALS
OXYGEN SATURATION: 98 % | BODY MASS INDEX: 25.71 KG/M2 | RESPIRATION RATE: 18 BRPM | TEMPERATURE: 98.8 F | DIASTOLIC BLOOD PRESSURE: 60 MMHG | SYSTOLIC BLOOD PRESSURE: 121 MMHG | HEART RATE: 53 BPM | HEIGHT: 66 IN | WEIGHT: 160 LBS

## 2024-08-13 DIAGNOSIS — I10 BENIGN HYPERTENSION: Primary | ICD-10-CM

## 2024-08-13 DIAGNOSIS — E78.2 MIXED HYPERLIPIDEMIA: ICD-10-CM

## 2024-08-13 PROCEDURE — 99213 OFFICE O/P EST LOW 20 MIN: CPT | Performed by: INTERNAL MEDICINE

## 2024-08-13 PROCEDURE — 3078F DIAST BP <80 MM HG: CPT | Performed by: INTERNAL MEDICINE

## 2024-08-13 PROCEDURE — 3074F SYST BP LT 130 MM HG: CPT | Performed by: INTERNAL MEDICINE

## 2024-08-13 PROCEDURE — 1157F ADVNC CARE PLAN IN RCRD: CPT | Performed by: INTERNAL MEDICINE

## 2024-08-13 PROCEDURE — 1126F AMNT PAIN NOTED NONE PRSNT: CPT | Performed by: INTERNAL MEDICINE

## 2024-08-13 PROCEDURE — 1160F RVW MEDS BY RX/DR IN RCRD: CPT | Performed by: INTERNAL MEDICINE

## 2024-08-13 PROCEDURE — 1036F TOBACCO NON-USER: CPT | Performed by: INTERNAL MEDICINE

## 2024-08-13 PROCEDURE — 1159F MED LIST DOCD IN RCRD: CPT | Performed by: INTERNAL MEDICINE

## 2024-08-13 RX ORDER — ATENOLOL AND CHLORTHALIDONE TABLET 100; 25 MG/1; MG/1
0.5 TABLET ORAL DAILY
Qty: 45 TABLET | Refills: 3 | Status: SHIPPED | OUTPATIENT
Start: 2024-08-13 | End: 2025-08-13

## 2024-08-13 RX ORDER — TALC
3 POWDER (GRAM) TOPICAL NIGHTLY PRN
COMMUNITY

## 2024-08-13 RX ORDER — ATORVASTATIN CALCIUM 10 MG/1
10 TABLET, FILM COATED ORAL DAILY
Qty: 90 TABLET | Refills: 3 | Status: SHIPPED | OUTPATIENT
Start: 2024-08-13 | End: 2025-08-08

## 2024-08-13 ASSESSMENT — LIFESTYLE VARIABLES
HOW OFTEN DO YOU HAVE SIX OR MORE DRINKS ON ONE OCCASION: NEVER
SKIP TO QUESTIONS 9-10: 1
HOW MANY STANDARD DRINKS CONTAINING ALCOHOL DO YOU HAVE ON A TYPICAL DAY: PATIENT DOES NOT DRINK
AUDIT-C TOTAL SCORE: 0
HOW OFTEN DO YOU HAVE A DRINK CONTAINING ALCOHOL: NEVER

## 2024-08-13 ASSESSMENT — ENCOUNTER SYMPTOMS
OCCASIONAL FEELINGS OF UNSTEADINESS: 0
LOSS OF SENSATION IN FEET: 0
DEPRESSION: 0

## 2024-08-13 ASSESSMENT — PAIN SCALES - GENERAL: PAINLEVEL: 0-NO PAIN

## 2024-08-13 NOTE — PROGRESS NOTES
Subjective   Chief Complaint   Patient presents with    Annual Exam     Jonathan Campos presents to the office today for an annual follow up.         84-year-old male patient with history of hypertension hyperlipidemia history of negative stress test for ischemia 2011, echocardiogram done about 10 years ago was normal ejection fraction with a mild AR, TR as well as mild TR seen.  Patient had negative nuclear stress of ischemia about 4 years ago.  History of prostate surgery and back surgery about 4 years ago.  Currently on multiple medication.  No active chest pain tightness.  Patient had a CBC done about a 2 months ago essentially showed hemoglobin 12.5, hematocrit 39.3.  Plate count 199.  BMP was then 2 months ago was unremarkable except potassium is 3.4 which could be from underlying chlorthalidone.  Patient had a lipid profile was done but 9 months ago essentially total cholesterol 132, LDL is 79 mg deciliter, triglyceride is 99 mg/dL.  Patient hemoglobin A1c 5.9%.    Past Medical History:   Diagnosis Date    Chronic lumbar radiculopathy 08/28/2023    Gastro-esophageal reflux disease without esophagitis 08/28/2023    History of malignant neoplasm of prostate 11/02/2023    '99 prostectomy     History of pancreatitis 06/04/2024    GI CNP to see in August which is fine. Small cyst in pancreas, MRCP. No worries, no alcohol.    History of prostate cancer 11/02/2023    Impaired fasting blood sugar 11/02/2023    Neuropathy 08/28/2023    Back related    Personal history of malignant neoplasm of prostate     Spinal stenosis 08/28/2023    Vitamin D deficiency 08/28/2023     Past Surgical History:   Procedure Laterality Date    CARPAL TUNNEL RELEASE      CT GUIDED PERCUTANEOUS BIOPSY BONE DEEP  12/09/2020    CT GUIDED PERCUTANEOUS BIOPSY BONE DEEP 12/9/2020 [facility]    LUMBAR LAMINECTOMY      PROSTATECTOMY  1999     Family medical history includes stroke in son.  Current Outpatient Medications  "  Medication Sig Dispense Refill    acetaminophen (Tylenol 8 HOUR) 650 mg ER tablet Take 1 tablet (650 mg) by mouth 3 times a day. Do not crush, chew, or split.      cholecalciferol (Vitamin D-3) 25 MCG (1000 UT) capsule Take 1 capsule (25 mcg) by mouth once daily.      cyanocobalamin (Vitamin B-12) 1,000 mcg tablet Take 1 tablet (1,000 mcg) by mouth once daily.      melatonin 3 mg tablet Take 1 tablet (3 mg) by mouth as needed at bedtime for sleep.      vitamins A,C,E-zinc-copper (PreserVision AREDS) 4,296 mcg-226 mg-90 mg capsule Take 1 tablet by mouth once daily.      atenoloL-chlorthalidone (Tenoretic) 100-25 mg tablet Take 0.5 tablets by mouth once daily. 45 tablet 3    atorvastatin (Lipitor) 10 mg tablet Take 1 tablet (10 mg) by mouth once daily. 90 tablet 3     No current facility-administered medications for this visit.      reports that he has never smoked. He has never been exposed to tobacco smoke. He has never used smokeless tobacco. He reports that he does not currently use alcohol. He reports that he does not use drugs.  Carisoprodol, Pravastatin sodium, and Simvastatin  Benign hypertension    Vitals:    08/13/24 1046   BP: 121/60   Pulse: 53   Resp: 18   Temp: 37.1 °C (98.8 °F)   SpO2: 98%   Weight: 72.6 kg (160 lb)   Height: 1.676 m (5' 6\")   PainSc: 0-No pain      BMI:Body mass index is 25.82 kg/m².   General Cardiology:  General Appearance: Alert, oriented and in no acute distress.  HEENT: extra ocular movements intact (EOMI), pupils equal,  round, reactive to light and accommodation (PERRLA).  Carotid Upstroke: no bruit, normal.  Jugular Venous Distention (JVD): flat.  Chest: normal.  Lungs: Clear to auscultation,   Heart Sounds: no S3 or S4, normal S1, S2, regular rate.  Murmur, Click, Gallop: no systolic murmur.  Abdomen: no hepatomegaly, no masses felt, soft.  Extremities: no leg edema.  Peripheral pulses: 2 plus bilateral.  NEUROLOGY Cranial nerves II-XII grossly intact.     Patient Active " Problem List   Diagnosis    Acquired hearing loss    Acquired spondylolisthesis    Benign hypertension    Bilateral impacted cerumen    Carpal tunnel syndrome    Cervicalgia    Chronic lumbar radiculopathy    Lumbar radiculitis    Lumbar stenosis with neurogenic claudication    Spinal stenosis    Osteoarthritis    Osteoarthritis of facet joint of lumbar spine    Gastro-esophageal reflux disease without esophagitis    Chest pain    Hoarseness    Low back pain    Lumbar post-laminectomy syndrome    Mixed hyperlipidemia    Neuropathy    Chronic rhinitis    Seasonal allergic rhinitis    Status post lumbar spine operation    Vitamin D deficiency    History of malignant neoplasm of prostate    Impaired fasting glucose    Impacted cerumen    Lower abdominal pain    Other specified cough    Overactive bladder    Overweight with body mass index (BMI) 25.0-29.9    Postinflammatory pulmonary fibrosis (Multi)    Carcinoma of prostate (Multi)    Pure hypercholesterolemia    Reactive hypoglycemia    Sensorineural hearing loss (SNHL) of both ears    Sinusitis    Wax in ear    Spinal stenosis of lumbar region    Acute pancreatitis without infection or necrosis, unspecified pancreatitis type ([facility])    History of pancreatitis       Problem List Items Addressed This Visit       Benign hypertension - Primary    Relevant Medications    atenoloL-chlorthalidone (Tenoretic) 100-25 mg tablet    Mixed hyperlipidemia    Relevant Medications    atenoloL-chlorthalidone (Tenoretic) 100-25 mg tablet    atorvastatin (Lipitor) 10 mg tablet      84-year-old patient with a history of hypertension, hyperlipidemia so far stable cardiac wise  1.  Hypertension: Continue current Tenoretic half a tablet p.o. daily.  Underlying low potassium is more likely due to chlorthalidone advised patient to eat 1 banana every day.  2.  Hyperlipidemia: Continue current Lipitor 10 mg tablet daily.  Modify risk factors.    Advised patient to avoid lunch meats, canned  soups, pizzas, bread rolls, and sandwiches. Advised patient to limit salt intake 1,500 mg daily. Advised patient to exercise 30 mins/3 times a week including treadmill or aerobic type, Goal to achieve 65% target HR.    Diet and exercise reviewed with patient..advice to walk about 10,000 steps or about 2 hours during day time. Cut back on salt, sugar and flour.    Pt. care time is spent includes review of diagnostic tests, labs, radiographs, EKGs, old echoes, cardiac work-up and coordination of care. Assessment, impression and plans are reflected in the note above as well as the orders.    Richard David MD

## 2024-08-20 ENCOUNTER — PATIENT OUTREACH (OUTPATIENT)
Dept: PRIMARY CARE | Facility: CLINIC | Age: 84
End: 2024-08-20
Payer: MEDICARE

## 2024-09-09 PROBLEM — Z85.46 HISTORY OF MALIGNANT NEOPLASM OF PROSTATE: Status: RESOLVED | Noted: 2023-11-02 | Resolved: 2024-09-09

## 2024-09-09 PROBLEM — H61.20 IMPACTED CERUMEN: Status: RESOLVED | Noted: 2024-05-14 | Resolved: 2024-09-09

## 2024-09-09 PROBLEM — H61.23 BILATERAL IMPACTED CERUMEN: Status: RESOLVED | Noted: 2023-08-28 | Resolved: 2024-09-09

## 2024-09-09 PROBLEM — R73.03 PREDIABETES: Status: ACTIVE | Noted: 2024-09-09

## 2024-09-09 PROBLEM — R07.9 CHEST PAIN: Status: RESOLVED | Noted: 2022-11-28 | Resolved: 2024-09-09

## 2024-09-09 PROBLEM — E16.1 REACTIVE HYPOGLYCEMIA: Status: RESOLVED | Noted: 2024-05-14 | Resolved: 2024-09-09

## 2024-09-09 PROBLEM — E53.8 VITAMIN B12 DEFICIENCY: Status: ACTIVE | Noted: 2024-09-09

## 2024-09-09 PROBLEM — K85.90 ACUTE PANCREATITIS WITHOUT INFECTION OR NECROSIS, UNSPECIFIED PANCREATITIS TYPE (HHS-HCC): Status: RESOLVED | Noted: 2024-05-23 | Resolved: 2024-09-09

## 2024-09-09 PROBLEM — R05.8 OTHER SPECIFIED COUGH: Status: RESOLVED | Noted: 2022-11-28 | Resolved: 2024-09-09

## 2024-09-09 PROBLEM — Z98.890 STATUS POST LUMBAR SPINE OPERATION: Status: RESOLVED | Noted: 2023-08-28 | Resolved: 2024-09-09

## 2024-09-09 PROBLEM — C61 CARCINOMA OF PROSTATE (MULTI): Status: RESOLVED | Noted: 2024-05-14 | Resolved: 2024-09-09

## 2024-09-09 PROBLEM — J32.9 SINUSITIS: Status: RESOLVED | Noted: 2024-05-14 | Resolved: 2024-09-09

## 2024-09-09 PROBLEM — R10.30 LOWER ABDOMINAL PAIN: Status: RESOLVED | Noted: 2024-05-14 | Resolved: 2024-09-09

## 2024-09-09 PROBLEM — H61.20 WAX IN EAR: Status: RESOLVED | Noted: 2024-05-14 | Resolved: 2024-09-09

## 2024-09-09 NOTE — PROGRESS NOTES
Subjective   Patient ID: Jonathan Campos is a 84 y.o. male who presents for [facility].    HPI   Pt comes in to [facility] [place] [facility]; transfer from [facility].   HTN/HLD: sees cardiology lipids are fine  Prediabetes: hga1c 5.7  Vit d defic: due labwork  Vit b12 defic: due labwork  H/o of prostate cancer: sees urology  Chronic back pain: no longer sees PM s/p surgery; takes tylenol tid     Does c.o of intermittent dizziness when upright that started several weeks ago. No uri sxs; weather has been changing recently and fluctuating.  No cp, ha, sob either.l  Past Medical History:   Diagnosis Date    Acquired hearing loss 08/28/2023    Acquired spondylolisthesis 03/16/2018    Benign hypertension 03/17/2022    David    Carpal tunnel syndrome 08/28/2023    Cervicalgia 08/28/2023    Chronic lumbar radiculopathy 08/28/2023    Chronic rhinitis 08/28/2023    Gastro-esophageal reflux disease without esophagitis 08/28/2023    History of malignant neoplasm of prostate 11/02/2023    '99 prostectomy     History of pancreatitis 06/04/2024    GI CNP to see in August which is fine. Small cyst in pancreas, MRCP. No worries, no alcohol.    Hoarseness 08/28/2023    Low back pain 08/28/2023    Lumbar post-laminectomy syndrome 08/28/2023    Mixed hyperlipidemia 08/28/2023    David    Neuropathy 08/28/2023    Back related    Osteoarthritis 08/28/2023    Overactive bladder 05/14/2024    Overweight with body mass index (BMI) 25.0-29.9 05/14/2024    Postinflammatory pulmonary fibrosis (Multi) 09/30/2022    Prediabetes 09/09/2024    Seasonal allergic rhinitis 08/28/2023    Sensorineural hearing loss (SNHL) of both ears 05/14/2024    Spinal stenosis 08/28/2023    Vitamin D deficiency 08/28/2023     Current Outpatient Medications   Medication Sig Dispense Refill    acetaminophen (Tylenol 8 HOUR) 650 mg ER tablet Take 1 tablet (650 mg) by mouth 3 times a day. Do not crush, chew, or split.      atenoloL-chlorthalidone (Tenoretic)  "100-25 mg tablet Take 0.5 tablets by mouth once daily. 45 tablet 3    atorvastatin (Lipitor) 10 mg tablet Take 1 tablet (10 mg) by mouth once daily. 90 tablet 3    cholecalciferol (Vitamin D-3) 25 MCG (1000 UT) capsule Take 1 capsule (25 mcg) by mouth once daily.      cyanocobalamin (Vitamin B-12) 1,000 mcg tablet Take 1 tablet (1,000 mcg) by mouth once daily.      melatonin 3 mg tablet Take 1 tablet (3 mg) by mouth as needed at bedtime for sleep.      vitamins A,C,E-zinc-copper (PreserVision AREDS) 4,296 mcg-226 mg-90 mg capsule Take 1 tablet by mouth once daily.       No current facility-administered medications for this visit.       Review of Systems see hpi  Labs:  Lab Results   Component Value Date    GLUCOSE 95 10/14/2024    CALCIUM 8.8 10/14/2024     10/14/2024    K 3.2 (L) 10/14/2024    CO2 33 (H) 10/14/2024     10/14/2024    BUN 18 10/14/2024    CREATININE 1.04 10/14/2024     Lab Results   Component Value Date    CHOL 137 10/14/2024    CHOL 132 11/03/2023    CHOL 152 09/30/2022     Lab Results   Component Value Date    HDL 31.2 10/14/2024    HDL 32.9 11/03/2023    HDL 32 (L) 09/30/2022     Lab Results   Component Value Date    LDLCALC 84 10/14/2024    LDLCALC 79 11/03/2023    LDLCALC 94 09/30/2022     Lab Results   Component Value Date    TRIG 111 10/14/2024    TRIG 99 11/03/2023    TRIG 128 09/30/2022     No components found for: \"CHOLHDL\"  Lab Results   Component Value Date    HGBA1C 5.7 (H) 10/14/2024     Lab Results   Component Value Date    WBC 6.1 10/14/2024    HGB 14.2 10/14/2024    HCT 45.0 10/14/2024    MCV 89 10/14/2024     10/14/2024       Objective   /72   Pulse 56   Ht 1.676 m (5' 6\")   Wt 73.6 kg (162 lb 3.2 oz)   SpO2 98%   BMI 26.18 kg/m²     Physical Exam  Vitals reviewed.   Constitutional:       Appearance: Normal appearance.   Eyes:      Extraocular Movements: Extraocular movements intact.      Pupils: Pupils are equal, round, and reactive to light. "   Cardiovascular:      Rate and Rhythm: Normal rate and regular rhythm.      Heart sounds: Normal heart sounds.   Pulmonary:      Effort: Pulmonary effort is normal.      Breath sounds: Normal breath sounds.   Neurological:      Mental Status: He is alert.      Deep Tendon Reflexes: Reflexes are normal and symmetric.           Jonathan was seen today for establish care.  Diagnoses and all orders for this visit:  Prediabetes (Primary)  Comments:  cont diet/exercise  Vitamin B12 deficiency  -     Vitamin B12; Future  Vitamin D deficiency  -     Vitamin D 25-Hydroxy,Total (for eval of Vitamin D levels); Future  Benign hypertension  Comments:  f/u with cardio  Mixed hyperlipidemia  Comments:  f/u with cardio  History of prostate cancer  Comments:  f/u with urology  Chronic lumbar radiculopathy  Comments:  f/u with pain management

## 2024-10-14 ENCOUNTER — LAB (OUTPATIENT)
Dept: LAB | Facility: LAB | Age: 84
End: 2024-10-14
Payer: MEDICARE

## 2024-10-14 DIAGNOSIS — R73.01 IMPAIRED FASTING GLUCOSE: ICD-10-CM

## 2024-10-14 DIAGNOSIS — E78.00 PURE HYPERCHOLESTEROLEMIA: ICD-10-CM

## 2024-10-14 LAB
ALBUMIN SERPL BCP-MCNC: 3.7 G/DL (ref 3.4–5)
ALP SERPL-CCNC: 48 U/L (ref 33–136)
ALT SERPL W P-5'-P-CCNC: 10 U/L (ref 10–52)
ANION GAP SERPL CALC-SCNC: <7 MMOL/L (ref 10–20)
AST SERPL W P-5'-P-CCNC: 14 U/L (ref 9–39)
BASOPHILS # BLD AUTO: 0.03 X10*3/UL (ref 0–0.1)
BASOPHILS NFR BLD AUTO: 0.5 %
BILIRUB SERPL-MCNC: 0.6 MG/DL (ref 0–1.2)
BUN SERPL-MCNC: 18 MG/DL (ref 6–23)
CALCIUM SERPL-MCNC: 8.8 MG/DL (ref 8.6–10.3)
CHLORIDE SERPL-SCNC: 104 MMOL/L (ref 98–107)
CHOLEST SERPL-MCNC: 137 MG/DL (ref 0–199)
CHOLESTEROL/HDL RATIO: 4.4
CO2 SERPL-SCNC: 33 MMOL/L (ref 21–32)
CREAT SERPL-MCNC: 1.04 MG/DL (ref 0.5–1.3)
EGFRCR SERPLBLD CKD-EPI 2021: 70 ML/MIN/1.73M*2
EOSINOPHIL # BLD AUTO: 0.21 X10*3/UL (ref 0–0.4)
EOSINOPHIL NFR BLD AUTO: 3.4 %
ERYTHROCYTE [DISTWIDTH] IN BLOOD BY AUTOMATED COUNT: 13.9 % (ref 11.5–14.5)
EST. AVERAGE GLUCOSE BLD GHB EST-MCNC: 117 MG/DL
GLUCOSE SERPL-MCNC: 95 MG/DL (ref 74–99)
HBA1C MFR BLD: 5.7 %
HCT VFR BLD AUTO: 45 % (ref 41–52)
HDLC SERPL-MCNC: 31.2 MG/DL
HGB BLD-MCNC: 14.2 G/DL (ref 13.5–17.5)
IMM GRANULOCYTES # BLD AUTO: 0.01 X10*3/UL (ref 0–0.5)
IMM GRANULOCYTES NFR BLD AUTO: 0.2 % (ref 0–0.9)
LDLC SERPL CALC-MCNC: 84 MG/DL
LYMPHOCYTES # BLD AUTO: 1.22 X10*3/UL (ref 0.8–3)
LYMPHOCYTES NFR BLD AUTO: 20 %
MCH RBC QN AUTO: 28.2 PG (ref 26–34)
MCHC RBC AUTO-ENTMCNC: 31.6 G/DL (ref 32–36)
MCV RBC AUTO: 89 FL (ref 80–100)
MONOCYTES # BLD AUTO: 0.81 X10*3/UL (ref 0.05–0.8)
MONOCYTES NFR BLD AUTO: 13.3 %
NEUTROPHILS # BLD AUTO: 3.83 X10*3/UL (ref 1.6–5.5)
NEUTROPHILS NFR BLD AUTO: 62.6 %
NON HDL CHOLESTEROL: 106 MG/DL (ref 0–149)
NRBC BLD-RTO: 0 /100 WBCS (ref 0–0)
PLATELET # BLD AUTO: 229 X10*3/UL (ref 150–450)
POTASSIUM SERPL-SCNC: 3.2 MMOL/L (ref 3.5–5.3)
PROT SERPL-MCNC: 6.2 G/DL (ref 6.4–8.2)
RBC # BLD AUTO: 5.04 X10*6/UL (ref 4.5–5.9)
SODIUM SERPL-SCNC: 138 MMOL/L (ref 136–145)
TRIGL SERPL-MCNC: 111 MG/DL (ref 0–149)
VLDL: 22 MG/DL (ref 0–40)
WBC # BLD AUTO: 6.1 X10*3/UL (ref 4.4–11.3)

## 2024-10-14 PROCEDURE — 36415 COLL VENOUS BLD VENIPUNCTURE: CPT

## 2024-10-14 PROCEDURE — 83036 HEMOGLOBIN GLYCOSYLATED A1C: CPT

## 2024-10-15 ENCOUNTER — TELEPHONE (OUTPATIENT)
Dept: PRIMARY CARE | Facility: CLINIC | Age: 84
End: 2024-10-15
Payer: MEDICARE

## 2024-10-15 NOTE — TELEPHONE ENCOUNTER
Spoke with patient and spouse.  He has established with another physician in [place]  that is a  dr. Weiss is oct 24.  Patient will address these labs then.

## 2024-10-15 NOTE — TELEPHONE ENCOUNTER
----- Message from [facility] sent at 10/14/2024  6:05 PM EDT -----  K is 3.2 - please clarify if he is on kcl as this is low and needs adjusted.

## 2024-10-24 ENCOUNTER — OFFICE VISIT (OUTPATIENT)
Dept: PRIMARY CARE | Facility: CLINIC | Age: 84
End: 2024-10-24
Payer: MEDICARE

## 2024-10-24 ENCOUNTER — LAB (OUTPATIENT)
Dept: LAB | Facility: LAB | Age: 84
End: 2024-10-24
Payer: MEDICARE

## 2024-10-24 VITALS
DIASTOLIC BLOOD PRESSURE: 72 MMHG | HEART RATE: 56 BPM | BODY MASS INDEX: 26.07 KG/M2 | WEIGHT: 162.2 LBS | OXYGEN SATURATION: 98 % | HEIGHT: 66 IN | SYSTOLIC BLOOD PRESSURE: 124 MMHG

## 2024-10-24 DIAGNOSIS — E53.8 VITAMIN B12 DEFICIENCY: ICD-10-CM

## 2024-10-24 DIAGNOSIS — E78.2 MIXED HYPERLIPIDEMIA: ICD-10-CM

## 2024-10-24 DIAGNOSIS — R73.03 PREDIABETES: Primary | ICD-10-CM

## 2024-10-24 DIAGNOSIS — M54.16 CHRONIC LUMBAR RADICULOPATHY: ICD-10-CM

## 2024-10-24 DIAGNOSIS — Z85.46 HISTORY OF PROSTATE CANCER: ICD-10-CM

## 2024-10-24 DIAGNOSIS — E55.9 VITAMIN D DEFICIENCY: ICD-10-CM

## 2024-10-24 DIAGNOSIS — I10 BENIGN HYPERTENSION: ICD-10-CM

## 2024-10-24 LAB
25(OH)D3 SERPL-MCNC: 53 NG/ML (ref 30–100)
VIT B12 SERPL-MCNC: 782 PG/ML (ref 211–911)

## 2024-10-24 PROCEDURE — 1160F RVW MEDS BY RX/DR IN RCRD: CPT | Performed by: FAMILY MEDICINE

## 2024-10-24 PROCEDURE — 99214 OFFICE O/P EST MOD 30 MIN: CPT | Performed by: FAMILY MEDICINE

## 2024-10-24 PROCEDURE — 3078F DIAST BP <80 MM HG: CPT | Performed by: FAMILY MEDICINE

## 2024-10-24 PROCEDURE — 82607 VITAMIN B-12: CPT

## 2024-10-24 PROCEDURE — 1157F ADVNC CARE PLAN IN RCRD: CPT | Performed by: FAMILY MEDICINE

## 2024-10-24 PROCEDURE — 36415 COLL VENOUS BLD VENIPUNCTURE: CPT

## 2024-10-24 PROCEDURE — 1036F TOBACCO NON-USER: CPT | Performed by: FAMILY MEDICINE

## 2024-10-24 PROCEDURE — 82306 VITAMIN D 25 HYDROXY: CPT

## 2024-10-24 PROCEDURE — 1159F MED LIST DOCD IN RCRD: CPT | Performed by: FAMILY MEDICINE

## 2024-10-24 PROCEDURE — 1126F AMNT PAIN NOTED NONE PRSNT: CPT | Performed by: FAMILY MEDICINE

## 2024-10-24 PROCEDURE — 3074F SYST BP LT 130 MM HG: CPT | Performed by: FAMILY MEDICINE

## 2024-10-24 ASSESSMENT — PATIENT HEALTH QUESTIONNAIRE - PHQ9
SUM OF ALL RESPONSES TO PHQ9 QUESTIONS 1 AND 2: 0
2. FEELING DOWN, DEPRESSED OR HOPELESS: NOT AT ALL
1. LITTLE INTEREST OR PLEASURE IN DOING THINGS: NOT AT ALL

## 2024-10-24 ASSESSMENT — PAIN SCALES - GENERAL: PAINLEVEL_OUTOF10: 0-NO PAIN

## 2024-10-28 ENCOUNTER — CLINICAL SUPPORT (OUTPATIENT)
Dept: AUDIOLOGY | Facility: CLINIC | Age: 84
End: 2024-10-28

## 2024-10-28 ENCOUNTER — DOCUMENTATION (OUTPATIENT)
Dept: AUDIOLOGY | Facility: CLINIC | Age: 84
End: 2024-10-28

## 2024-10-29 ENCOUNTER — CLINICAL SUPPORT (OUTPATIENT)
Dept: AUDIOLOGY | Facility: CLINIC | Age: 84
End: 2024-10-29
Payer: MEDICARE

## 2024-10-29 DIAGNOSIS — H90.3 SENSORINEURAL HEARING LOSS (SNHL) OF BOTH EARS: Primary | ICD-10-CM

## 2025-02-18 ENCOUNTER — APPOINTMENT (OUTPATIENT)
Dept: CARDIOLOGY | Facility: CLINIC | Age: 85
End: 2025-02-18
Payer: MEDICARE

## 2025-03-25 ENCOUNTER — APPOINTMENT (OUTPATIENT)
Dept: CARDIOLOGY | Facility: CLINIC | Age: 85
End: 2025-03-25
Payer: MEDICARE

## 2025-03-25 VITALS
WEIGHT: 162 LBS | TEMPERATURE: 98.6 F | HEIGHT: 66 IN | HEART RATE: 47 BPM | SYSTOLIC BLOOD PRESSURE: 137 MMHG | OXYGEN SATURATION: 95 % | RESPIRATION RATE: 18 BRPM | BODY MASS INDEX: 26.03 KG/M2 | DIASTOLIC BLOOD PRESSURE: 62 MMHG

## 2025-03-25 DIAGNOSIS — I10 BENIGN HYPERTENSION: Primary | ICD-10-CM

## 2025-03-25 DIAGNOSIS — E78.2 MIXED HYPERLIPIDEMIA: ICD-10-CM

## 2025-03-25 PROCEDURE — 1159F MED LIST DOCD IN RCRD: CPT | Performed by: INTERNAL MEDICINE

## 2025-03-25 PROCEDURE — 3078F DIAST BP <80 MM HG: CPT | Performed by: INTERNAL MEDICINE

## 2025-03-25 PROCEDURE — 1036F TOBACCO NON-USER: CPT | Performed by: INTERNAL MEDICINE

## 2025-03-25 PROCEDURE — 99214 OFFICE O/P EST MOD 30 MIN: CPT | Performed by: INTERNAL MEDICINE

## 2025-03-25 PROCEDURE — G2211 COMPLEX E/M VISIT ADD ON: HCPCS | Performed by: INTERNAL MEDICINE

## 2025-03-25 PROCEDURE — 3075F SYST BP GE 130 - 139MM HG: CPT | Performed by: INTERNAL MEDICINE

## 2025-03-25 PROCEDURE — 1125F AMNT PAIN NOTED PAIN PRSNT: CPT | Performed by: INTERNAL MEDICINE

## 2025-03-25 PROCEDURE — 1157F ADVNC CARE PLAN IN RCRD: CPT | Performed by: INTERNAL MEDICINE

## 2025-03-25 RX ORDER — ATORVASTATIN CALCIUM 10 MG/1
10 TABLET, FILM COATED ORAL DAILY
Qty: 90 TABLET | Refills: 3 | Status: SHIPPED | OUTPATIENT
Start: 2025-03-25 | End: 2026-03-25

## 2025-03-25 RX ORDER — ATENOLOL AND CHLORTHALIDONE TABLET 100; 25 MG/1; MG/1
0.5 TABLET ORAL DAILY
Qty: 45 TABLET | Refills: 3 | Status: SHIPPED | OUTPATIENT
Start: 2025-03-25 | End: 2026-03-25

## 2025-03-25 ASSESSMENT — ENCOUNTER SYMPTOMS
OCCASIONAL FEELINGS OF UNSTEADINESS: 1
LOSS OF SENSATION IN FEET: 0
DEPRESSION: 0

## 2025-03-25 ASSESSMENT — LIFESTYLE VARIABLES
HOW OFTEN DO YOU HAVE A DRINK CONTAINING ALCOHOL: NEVER
HOW MANY STANDARD DRINKS CONTAINING ALCOHOL DO YOU HAVE ON A TYPICAL DAY: PATIENT DOES NOT DRINK
SKIP TO QUESTIONS 9-10: 1
HOW OFTEN DO YOU HAVE SIX OR MORE DRINKS ON ONE OCCASION: NEVER
AUDIT-C TOTAL SCORE: 0

## 2025-03-25 ASSESSMENT — PATIENT HEALTH QUESTIONNAIRE - PHQ9
1. LITTLE INTEREST OR PLEASURE IN DOING THINGS: NOT AT ALL
2. FEELING DOWN, DEPRESSED OR HOPELESS: NOT AT ALL
SUM OF ALL RESPONSES TO PHQ9 QUESTIONS 1 AND 2: 0

## 2025-03-25 ASSESSMENT — PAIN SCALES - GENERAL: PAINLEVEL_OUTOF10: 6

## 2025-03-25 NOTE — PROGRESS NOTES
Subjective   Chief Complaint   Patient presents with    Follow-up     Jonathan Campos presents to the office today for a 6 month follow up.        84-year-old patient with a multiple comorbid condition history of hypertension.  Hyperlipidemia.  Negative stress test for ischemia about 10 years ago.  Echocardiogram in the past also showed normal ejection fraction.  History of prostate surgery back surgery in past.  Currently on Lipitor and Tenoretic.  Patient lipid profile done 5 months ago total cholesterol 137 with triglyceride 111 and LDL was 84.  Hemoglobin A1c was 5 months ago showed a 5.7%.  Comprehensive metabolic panel done 5 months ago was unremarkable except potassium was low normal at 3.2.  Vitamin D was normal.    Past Medical History:   Diagnosis Date    Acquired hearing loss 08/28/2023    Acquired spondylolisthesis 03/16/2018    Benign hypertension 03/17/2022    David    Carpal tunnel syndrome 08/28/2023    Cervicalgia 08/28/2023    Chronic lumbar radiculopathy 08/28/2023    Chronic rhinitis 08/28/2023    Gastro-esophageal reflux disease without esophagitis 08/28/2023    History of malignant neoplasm of prostate 11/02/2023    '99 prostectomy     History of pancreatitis 06/04/2024    GI CNP to see in August which is fine. Small cyst in pancreas, MRCP. No worries, no alcohol.    Hoarseness 08/28/2023    Low back pain 08/28/2023    Lumbar post-laminectomy syndrome 08/28/2023    Mixed hyperlipidemia 08/28/2023    David    Neuropathy 08/28/2023    Back related    Osteoarthritis 08/28/2023    Overactive bladder 05/14/2024    Overweight with body mass index (BMI) 25.0-29.9 05/14/2024    Postinflammatory pulmonary fibrosis (Multi) 09/30/2022    Prediabetes 09/09/2024    Seasonal allergic rhinitis 08/28/2023    Sensorineural hearing loss (SNHL) of both ears 05/14/2024    Spinal stenosis 08/28/2023    Vitamin D deficiency 08/28/2023     Past Surgical History:   Procedure Laterality Date    CARPAL  TUNNEL RELEASE      CT GUIDED PERCUTANEOUS BIOPSY BONE DEEP  12/09/2020    CT GUIDED PERCUTANEOUS BIOPSY BONE DEEP 12/9/2020 [facility]    LUMBAR LAMINECTOMY      PROSTATECTOMY  1999     Family medical history includes stroke in son.  Current Outpatient Medications   Medication Sig Dispense Refill    acetaminophen (Tylenol 8 HOUR) 650 mg ER tablet Take 1 tablet (650 mg) by mouth 3 times a day. Do not crush, chew, or split.      atenoloL-chlorthalidone (Tenoretic) 100-25 mg tablet Take 0.5 tablets by mouth once daily. 45 tablet 3    atorvastatin (Lipitor) 10 mg tablet Take 1 tablet (10 mg) by mouth once daily. 90 tablet 3    cholecalciferol (Vitamin D-3) 25 MCG (1000 UT) capsule Take 1 capsule (25 mcg) by mouth once daily.      cyanocobalamin (Vitamin B-12) 1,000 mcg tablet Take 1 tablet (1,000 mcg) by mouth once daily.      melatonin 3 mg tablet Take 1 tablet (3 mg) by mouth as needed at bedtime for sleep.      vitamins A,C,E-zinc-copper (PreserVision AREDS) 4,296 mcg-226 mg-90 mg capsule Take 1 tablet by mouth once daily.       No current facility-administered medications for this visit.      reports that he has never smoked. He has never been exposed to tobacco smoke. He has never used smokeless tobacco. He reports that he does not currently use alcohol. He reports that he does not use drugs.  Allergies:  Carisoprodol, Pravastatin sodium, and Simvastatin    ROS: See HPI  CONSTITUTIONAL: Chills- none. Fever- none. Weight change appropriate for age.  HEENT: Headache- Negative.  Change in vision- none.  Ear pain- none. Nasal congestion- none. Post-nasal drip-none.  Sore throat-none.  CARDIOLOGY: Chest pain- none.  Leg edema-trace.  Murmurs-soft systolic.  Palpitation- none.  RESPIRATORY: Denies any shortness of breath.  GI: Abdominal pain- none.  Change in bowel habits- none.  Constipation- none.  Diarrhea- none.  Nausea- none.  Vomiting- none.  MUSCULOSKELETAL: Joint pain- none.  Muscle aches-  "none.  DERMATOLOGY: Rash- none.  NEUROLOGY: Dizziness- none.   Headache- none.  PSYCHIATRY: Denies any depression or anxiety     Vitals:    03/25/25 1145   BP: 137/62   Pulse: (!) 47   Resp: 18   Temp: 37 °C (98.6 °F)   SpO2: 95%   Weight: 73.5 kg (162 lb)   Height: 1.676 m (5' 6\")   PainSc:   6   PainLoc: Arm  Comment: intermittent left arm pain      BMI:Body mass index is 26.15 kg/m².   General Cardiology:  General Appearance: Alert, oriented and in no acute distress.  HEENT: extra ocular movements intact (EOMI), pupils equal,  round, reactive to light and accommodation (PERRLA).  Carotid Upstroke: no bruit, normal.  Jugular Venous Distention (JVD): flat.  Chest: normal.  Lungs: Clear to auscultation,   Heart Sounds: no S3 or S4, normal S1, S2, regular rate.  Murmur, Click, Gallop: no systolic murmur.  Abdomen: no hepatomegaly, no masses felt, soft.  Extremities: no leg edema.  Peripheral pulses: 2 plus bilateral.  NEUROLOGY Cranial nerves II-XII grossly intact.     Patient Active Problem List   Diagnosis    Acquired hearing loss    Acquired spondylolisthesis    Benign hypertension    Carpal tunnel syndrome    Cervicalgia    Chronic lumbar radiculopathy    Lumbar stenosis with neurogenic claudication    Osteoarthritis    Osteoarthritis of facet joint of lumbar spine    Gastro-esophageal reflux disease without esophagitis    Hoarseness    Low back pain    Lumbar post-laminectomy syndrome    Mixed hyperlipidemia    Neuropathy    Chronic rhinitis    Seasonal allergic rhinitis    Vitamin D deficiency    Overactive bladder    Overweight with body mass index (BMI) 25.0-29.9    Postinflammatory pulmonary fibrosis (Multi)    Sensorineural hearing loss (SNHL) of both ears    History of pancreatitis    Prediabetes    Vitamin B12 deficiency     Assessment/Plan             Problem List Items Addressed This Visit       Benign hypertension - Primary    Mixed hyperlipidemia      84-year-old patient with a history of hypertension " currently on Tenoretic.  Low potassium due to diuretics.  No active chest pain tightness.  1.  Hypertension: Continue current Tenoretic.  2.  Hyperlipidemia: Continue current Lipitor 10 mg tablet once a day.    Advised patient to avoid lunch meats, canned soups, pizzas, bread rolls, and sandwiches. Advised patient to limit salt intake 1,500 mg daily. Advised patient to exercise 30 mins/3 times a week including treadmill or aerobic type, Goal to achieve 65% target HR.    Diet and exercise reviewed with patient..advice to walk about 10,000 steps or about 2 hours during day time. Cut back on salt, sugar and flour.    Pt. care time is spent includes review of diagnostic tests, labs, radiographs, EKGs, old echoes, cardiac work-up and coordination of care. Assessment, impression and plans are reflected in the note above as well as the orders.    Richard David MD

## 2025-04-14 ENCOUNTER — PATIENT OUTREACH (OUTPATIENT)
Dept: CARE COORDINATION | Facility: CLINIC | Age: 85
End: 2025-04-14
Payer: MEDICARE

## 2025-04-14 NOTE — PROGRESS NOTES
Date: 04/14/25    Dear Jonathan Campos    Our records indicate that you are due for the following appointment or test: Annual Wellness Visit      Please call our office at your earliest convenience. We can assist you with scheduling an appointment or test.  If you have already scheduled an appointment or have completed testing, please disregard this letter.    Sincerely,    Annie Byrne, Patient Navigator    Office Phone: [phone]

## 2025-04-29 NOTE — PROGRESS NOTES
Subjective   Patient ID: Jonathan Campos is a 84 y.o. male who presents for Shoulder Pain.    HPI   Pt comes in with left shoulder pain for the last 2 months.  Denies any injury. Taking tylenol 650 mg TID.  Lifting arm up and to side increases pain.  Can't reach behind his back.      Past Medical History:   Diagnosis Date    Acquired hearing loss 08/28/2023    Acquired spondylolisthesis 03/16/2018    Benign hypertension 03/17/2022    David    Carpal tunnel syndrome 08/28/2023    Cervicalgia 08/28/2023    Chronic lumbar radiculopathy 08/28/2023    Chronic rhinitis 08/28/2023    Gastro-esophageal reflux disease without esophagitis 08/28/2023    History of malignant neoplasm of prostate 11/02/2023    '99 prostectomy     History of pancreatitis 06/04/2024    GI CNP to see in August which is fine. Small cyst in pancreas, MRCP. No worries, no alcohol.    Hoarseness 08/28/2023    Low back pain 08/28/2023    Lumbar post-laminectomy syndrome 08/28/2023    Mixed hyperlipidemia 08/28/2023    David    Neuropathy 08/28/2023    Back related    Osteoarthritis 08/28/2023    Overactive bladder 05/14/2024    Overweight with body mass index (BMI) 25.0-29.9 05/14/2024    Postinflammatory pulmonary fibrosis (Multi) 09/30/2022    Prediabetes 09/09/2024    Seasonal allergic rhinitis 08/28/2023    Sensorineural hearing loss (SNHL) of both ears 05/14/2024    Spinal stenosis 08/28/2023    Vitamin D deficiency 08/28/2023       Current Outpatient Medications   Medication Sig Dispense Refill    acetaminophen (Tylenol 8 HOUR) 650 mg ER tablet Take 1 tablet (650 mg) by mouth 3 times a day. Do not crush, chew, or split.      atenoloL-chlorthalidone (Tenoretic) 100-25 mg tablet Take 0.5 tablets by mouth once daily. 45 tablet 3    atorvastatin (Lipitor) 10 mg tablet Take 1 tablet (10 mg) by mouth once daily. 90 tablet 3    cholecalciferol (Vitamin D-3) 25 MCG (1000 UT) capsule Take 1 capsule (25 mcg) by mouth once daily.       cyanocobalamin (Vitamin B-12) 1,000 mcg tablet Take 1 tablet (1,000 mcg) by mouth once daily.      melatonin 3 mg tablet Take 1 tablet (3 mg) by mouth as needed at bedtime for sleep.      vitamins A,C,E-zinc-copper (PreserVision AREDS) 4,296 mcg-226 mg-90 mg capsule Take 1 tablet by mouth once daily.      methylPREDNISolone (Medrol Dospak) 4 mg tablets Take each day's dose once a day in the AM with food 1 each 0     Current Facility-Administered Medications   Medication Dose Route Frequency Provider Last Rate Last Admin    methylPREDNISolone acetate (DEPO-Medrol) injection 80 mg  80 mg intramuscular Once Ivonne Vaughn MD           Review of Systems see hpi    Objective   /72   Pulse 53   Wt 73.7 kg (162 lb 6.4 oz)   SpO2 98%   BMI 26.21 kg/m²     Physical Exam  Vitals reviewed.   Constitutional:       Appearance: Normal appearance.   Musculoskeletal:      Comments: Left shoulder: slight palpable tenderness over bursa.  Negative colón, drop arm, external rotation, and cross body adduction.  Positive lift off subscapularis, empty can supraspinatus   Neurological:      Mental Status: He is alert.         Assessment/Plan   Assessment & Plan  Chronic left shoulder pain    Orders:    XR shoulder left 2+ views; Future    methylPREDNISolone acetate (DEPO-Medrol) injection 80 mg    methylPREDNISolone (Medrol Dospak) 4 mg tablets; Take each day's dose once a day in the AM with food

## 2025-05-06 ENCOUNTER — OFFICE VISIT (OUTPATIENT)
Dept: PRIMARY CARE | Facility: CLINIC | Age: 85
End: 2025-05-06
Payer: MEDICARE

## 2025-05-06 ENCOUNTER — HOSPITAL ENCOUNTER (OUTPATIENT)
Dept: RADIOLOGY | Facility: HOSPITAL | Age: 85
Discharge: HOME | End: 2025-05-06
Payer: MEDICARE

## 2025-05-06 VITALS
BODY MASS INDEX: 26.21 KG/M2 | OXYGEN SATURATION: 98 % | DIASTOLIC BLOOD PRESSURE: 72 MMHG | SYSTOLIC BLOOD PRESSURE: 134 MMHG | WEIGHT: 162.4 LBS | HEART RATE: 53 BPM

## 2025-05-06 DIAGNOSIS — M25.512 CHRONIC LEFT SHOULDER PAIN: Primary | ICD-10-CM

## 2025-05-06 DIAGNOSIS — G89.29 CHRONIC LEFT SHOULDER PAIN: Primary | ICD-10-CM

## 2025-05-06 DIAGNOSIS — G89.29 CHRONIC LEFT SHOULDER PAIN: ICD-10-CM

## 2025-05-06 DIAGNOSIS — M25.512 CHRONIC LEFT SHOULDER PAIN: ICD-10-CM

## 2025-05-06 PROCEDURE — 1126F AMNT PAIN NOTED NONE PRSNT: CPT | Performed by: FAMILY MEDICINE

## 2025-05-06 PROCEDURE — 96372 THER/PROPH/DIAG INJ SC/IM: CPT | Performed by: FAMILY MEDICINE

## 2025-05-06 PROCEDURE — 1159F MED LIST DOCD IN RCRD: CPT | Performed by: FAMILY MEDICINE

## 2025-05-06 PROCEDURE — 99213 OFFICE O/P EST LOW 20 MIN: CPT | Mod: 25 | Performed by: FAMILY MEDICINE

## 2025-05-06 PROCEDURE — 1036F TOBACCO NON-USER: CPT | Performed by: FAMILY MEDICINE

## 2025-05-06 PROCEDURE — 73030 X-RAY EXAM OF SHOULDER: CPT | Mod: LT

## 2025-05-06 PROCEDURE — 1160F RVW MEDS BY RX/DR IN RCRD: CPT | Performed by: FAMILY MEDICINE

## 2025-05-06 PROCEDURE — 3075F SYST BP GE 130 - 139MM HG: CPT | Performed by: FAMILY MEDICINE

## 2025-05-06 PROCEDURE — 3078F DIAST BP <80 MM HG: CPT | Performed by: FAMILY MEDICINE

## 2025-05-06 PROCEDURE — 2500000004 HC RX 250 GENERAL PHARMACY W/ HCPCS (ALT 636 FOR OP/ED): Mod: JZ | Performed by: FAMILY MEDICINE

## 2025-05-06 PROCEDURE — 73030 X-RAY EXAM OF SHOULDER: CPT | Mod: LEFT SIDE | Performed by: RADIOLOGY

## 2025-05-06 PROCEDURE — 1158F ADVNC CARE PLAN TLK DOCD: CPT | Performed by: FAMILY MEDICINE

## 2025-05-06 PROCEDURE — 99213 OFFICE O/P EST LOW 20 MIN: CPT | Performed by: FAMILY MEDICINE

## 2025-05-06 PROCEDURE — 1123F ACP DISCUSS/DSCN MKR DOCD: CPT | Performed by: FAMILY MEDICINE

## 2025-05-06 RX ORDER — METHYLPREDNISOLONE 4 MG/1
TABLET ORAL
Qty: 1 EACH | Refills: 0 | Status: SHIPPED | OUTPATIENT
Start: 2025-05-06

## 2025-05-06 RX ORDER — METHYLPREDNISOLONE ACETATE 80 MG/ML
80 INJECTION, SUSPENSION INTRA-ARTICULAR; INTRALESIONAL; INTRAMUSCULAR; SOFT TISSUE ONCE
Status: COMPLETED | OUTPATIENT
Start: 2025-05-06 | End: 2025-05-06

## 2025-05-06 RX ADMIN — METHYLPREDNISOLONE ACETATE 80 MG: 80 INJECTION, SUSPENSION INTRA-ARTICULAR; INTRALESIONAL; INTRAMUSCULAR; SOFT TISSUE at 11:34

## 2025-05-06 ASSESSMENT — PATIENT HEALTH QUESTIONNAIRE - PHQ9
2. FEELING DOWN, DEPRESSED OR HOPELESS: NOT AT ALL
SUM OF ALL RESPONSES TO PHQ9 QUESTIONS 1 AND 2: 0
1. LITTLE INTEREST OR PLEASURE IN DOING THINGS: NOT AT ALL

## 2025-05-06 ASSESSMENT — PAIN SCALES - GENERAL: PAINLEVEL_OUTOF10: 0-NO PAIN

## 2025-05-08 ENCOUNTER — TELEPHONE (OUTPATIENT)
Dept: PRIMARY CARE | Facility: CLINIC | Age: 85
End: 2025-05-08
Payer: MEDICARE

## 2025-05-08 DIAGNOSIS — G89.29 CHRONIC LEFT SHOULDER PAIN: Primary | ICD-10-CM

## 2025-05-08 DIAGNOSIS — M25.512 CHRONIC LEFT SHOULDER PAIN: Primary | ICD-10-CM

## 2025-05-08 NOTE — RESULT ENCOUNTER NOTE
Pt with moderate arthritis with bone spurs which are limiting his ROM.  Rec he see orthopedics.  Can refer to [facility] in [place] if he wants

## 2025-07-22 ENCOUNTER — OFFICE VISIT (OUTPATIENT)
Dept: PRIMARY CARE | Facility: CLINIC | Age: 85
End: 2025-07-22
Payer: MEDICARE

## 2025-07-22 VITALS
HEART RATE: 50 BPM | OXYGEN SATURATION: 98 % | BODY MASS INDEX: 26.98 KG/M2 | DIASTOLIC BLOOD PRESSURE: 48 MMHG | WEIGHT: 158 LBS | HEIGHT: 64 IN | TEMPERATURE: 98.1 F | SYSTOLIC BLOOD PRESSURE: 116 MMHG

## 2025-07-22 DIAGNOSIS — E78.2 MIXED HYPERLIPIDEMIA: ICD-10-CM

## 2025-07-22 DIAGNOSIS — E53.8 VITAMIN B12 DEFICIENCY: ICD-10-CM

## 2025-07-22 DIAGNOSIS — Z00.00 ROUTINE GENERAL MEDICAL EXAMINATION AT HEALTH CARE FACILITY: Primary | ICD-10-CM

## 2025-07-22 DIAGNOSIS — R53.83 FATIGUE, UNSPECIFIED TYPE: ICD-10-CM

## 2025-07-22 DIAGNOSIS — R73.03 PREDIABETES: ICD-10-CM

## 2025-07-22 DIAGNOSIS — E55.9 VITAMIN D DEFICIENCY: ICD-10-CM

## 2025-07-22 DIAGNOSIS — Z86.2 HISTORY OF ANEMIA: ICD-10-CM

## 2025-07-22 PROBLEM — H40.89 OTHER SPECIFIED GLAUCOMA: Status: ACTIVE | Noted: 2025-07-22

## 2025-07-22 PROCEDURE — 1036F TOBACCO NON-USER: CPT | Performed by: FAMILY MEDICINE

## 2025-07-22 PROCEDURE — 1159F MED LIST DOCD IN RCRD: CPT | Performed by: FAMILY MEDICINE

## 2025-07-22 PROCEDURE — 1160F RVW MEDS BY RX/DR IN RCRD: CPT | Performed by: FAMILY MEDICINE

## 2025-07-22 PROCEDURE — 99397 PER PM REEVAL EST PAT 65+ YR: CPT | Performed by: FAMILY MEDICINE

## 2025-07-22 PROCEDURE — 1170F FXNL STATUS ASSESSED: CPT | Performed by: FAMILY MEDICINE

## 2025-07-22 PROCEDURE — 99214 OFFICE O/P EST MOD 30 MIN: CPT | Mod: 25 | Performed by: FAMILY MEDICINE

## 2025-07-22 PROCEDURE — 99215 OFFICE O/P EST HI 40 MIN: CPT | Performed by: FAMILY MEDICINE

## 2025-07-22 PROCEDURE — 99397 PER PM REEVAL EST PAT 65+ YR: CPT | Mod: 25 | Performed by: FAMILY MEDICINE

## 2025-07-22 PROCEDURE — G0439 PPPS, SUBSEQ VISIT: HCPCS | Performed by: FAMILY MEDICINE

## 2025-07-22 PROCEDURE — 3078F DIAST BP <80 MM HG: CPT | Performed by: FAMILY MEDICINE

## 2025-07-22 PROCEDURE — 3074F SYST BP LT 130 MM HG: CPT | Performed by: FAMILY MEDICINE

## 2025-07-22 PROCEDURE — 99214 OFFICE O/P EST MOD 30 MIN: CPT | Performed by: FAMILY MEDICINE

## 2025-07-22 PROCEDURE — 1126F AMNT PAIN NOTED NONE PRSNT: CPT | Performed by: FAMILY MEDICINE

## 2025-07-22 ASSESSMENT — PATIENT HEALTH QUESTIONNAIRE - PHQ9
2. FEELING DOWN, DEPRESSED OR HOPELESS: NOT AT ALL
1. LITTLE INTEREST OR PLEASURE IN DOING THINGS: NOT AT ALL
SUM OF ALL RESPONSES TO PHQ9 QUESTIONS 1 AND 2: 0

## 2025-07-22 ASSESSMENT — ACTIVITIES OF DAILY LIVING (ADL)
MANAGING_FINANCES: INDEPENDENT
TAKING_MEDICATION: INDEPENDENT
GROCERY_SHOPPING: INDEPENDENT
DOING_HOUSEWORK: INDEPENDENT
DRESSING: INDEPENDENT
BATHING: INDEPENDENT

## 2025-07-22 ASSESSMENT — PAIN SCALES - GENERAL: PAINLEVEL_OUTOF10: 0-NO PAIN

## 2025-07-22 NOTE — PROGRESS NOTES
"Subjective   Reason for Visit: Jonathan Campos is an 85 y.o. male here for a Medicare Wellness visit.     Past Medical, Surgical, and Family History reviewed and updated in chart.  Medical History[1]    Reviewed all medications by prescribing practitioner or clinical pharmacist (such as prescriptions, OTCs, herbal therapies and supplements) and documented in the medical record.  Current Medications[2]    HPI here for subsequent medicare physical  C scope aged out  Psa aged out  Pt  has HLD, prediabetes, vit b12 and D deficiency and needs bloodwork done.  C.o fatigue as well with h/o of anemia in the past.  No snoring/apnea.  He sees cardiology for htn/hld    Patient Care Team:  Ivonne Vaughn MD as PCP - General (Family Medicine)  Ivonne Vaughn MD as PCP - MMO Medicare Advantage PCP  GEMA Galaviz-CNP as Nurse Practitioner (Gastroenterology)     Review of Systems n/a    Objective   Vitals:  BP (!) 116/48   Pulse 50   Temp 36.7 °C (98.1 °F)   Ht 1.632 m (5' 4.25\")   Wt 71.7 kg (158 lb)   SpO2 98%   BMI 26.91 kg/m²       Physical Exam  Vitals reviewed.   Constitutional:       Appearance: Normal appearance.   HENT:      Head: Normocephalic and atraumatic.      Mouth/Throat:      Mouth: Mucous membranes are moist.     Eyes:      Extraocular Movements: Extraocular movements intact.      Pupils: Pupils are equal, round, and reactive to light.       Cardiovascular:      Rate and Rhythm: Normal rate and regular rhythm.      Heart sounds: Normal heart sounds.   Pulmonary:      Effort: Pulmonary effort is normal.      Breath sounds: Normal breath sounds.   Abdominal:      Tenderness: There is no right CVA tenderness or left CVA tenderness.     Musculoskeletal:         General: Normal range of motion.   Lymphadenopathy:      Cervical: No cervical adenopathy.     Neurological:      General: No focal deficit present.      Mental Status: He is alert. Mental status is at baseline.      Motor: Motor function is " intact.      Coordination: Coordination is intact.      Deep Tendon Reflexes: Reflexes are normal and symmetric.     Psychiatric:         Mood and Affect: Mood normal.         Assessment & Plan  Routine general medical examination at health care facility    Orders:    1 Year Follow Up In Primary Care - Wellness Exam; Future    Mixed hyperlipidemia  Cont atorvastatin; f/u cardio  Orders:    Lipid Panel; Future    Prediabetes  Diet /exercise  Orders:    Hemoglobin A1C; Future    Basic Metabolic Panel; Future    Vitamin B12 deficiency  Cont b12  Orders:    Vitamin B12; Future    Vitamin D deficiency  Cont vit d  Orders:    Vitamin D 25-Hydroxy,Total (for eval of Vitamin D levels); Future    Fatigue, unspecified type    Orders:    CBC; Future    TSH with reflex to Free T4 if abnormal; Future    History of anemia    Orders:    Ferritin; Future                     [1]   Past Medical History:  Diagnosis Date    Acquired hearing loss 08/28/2023    Acquired spondylolisthesis 03/16/2018    Benign hypertension 03/17/2022    David    Carpal tunnel syndrome 08/28/2023    Cervicalgia 08/28/2023    Chronic lumbar radiculopathy 08/28/2023    Chronic rhinitis 08/28/2023    Gastro-esophageal reflux disease without esophagitis 08/28/2023    History of malignant neoplasm of prostate 11/02/2023    '99 prostectomy     History of pancreatitis 06/04/2024    GI CNP to see in August which is fine. Small cyst in pancreas, MRCP. No worries, no alcohol.    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 Do not crush, chew, or split.      atenoloL-chlorthalidone (Tenoretic) 100-25 mg tablet Take 0.5 tablets by mouth once daily. 45 tablet 3    atorvastatin (Lipitor) 10 mg tablet Take 1 tablet (10 mg) by mouth once daily. 90 tablet 3    cholecalciferol (Vitamin D-3) 25 MCG (1000 UT) capsule Take 1 capsule (25 mcg) by mouth once daily.      cyanocobalamin (Vitamin B-12) 1,000 mcg tablet Take 1 tablet (1,000 mcg) by mouth once daily.      melatonin 3 mg tablet Take 1 tablet (3 mg) by mouth as needed at bedtime for sleep.      vitamins A,C,E-zinc-copper (PreserVision AREDS) 4,296 mcg-226 mg-90 mg capsule Take 1 tablet by mouth once daily.       No current facility-administered medications for this visit.

## 2025-07-24 LAB
25(OH)D3+25(OH)D2 SERPL-MCNC: 67 NG/ML (ref 30–100)
ANION GAP SERPL CALCULATED.4IONS-SCNC: 6 MMOL/L (CALC) (ref 7–17)
BUN SERPL-MCNC: 16 MG/DL (ref 7–25)
BUN/CREAT SERPL: ABNORMAL (CALC) (ref 6–22)
CALCIUM SERPL-MCNC: 8.9 MG/DL (ref 8.6–10.3)
CHLORIDE SERPL-SCNC: 101 MMOL/L (ref 98–110)
CHOLEST SERPL-MCNC: 146 MG/DL
CHOLEST/HDLC SERPL: 4.6 (CALC)
CO2 SERPL-SCNC: 31 MMOL/L (ref 20–32)
CREAT SERPL-MCNC: 1.1 MG/DL (ref 0.7–1.22)
EGFRCR SERPLBLD CKD-EPI 2021: 65 ML/MIN/1.73M2
ERYTHROCYTE [DISTWIDTH] IN BLOOD BY AUTOMATED COUNT: 13.2 % (ref 11–15)
EST. AVERAGE GLUCOSE BLD GHB EST-MCNC: 123 MG/DL
EST. AVERAGE GLUCOSE BLD GHB EST-SCNC: 6.8 MMOL/L
FERRITIN SERPL-MCNC: 127 NG/ML (ref 24–380)
GLUCOSE SERPL-MCNC: 88 MG/DL (ref 65–99)
HBA1C MFR BLD: 5.9 %
HCT VFR BLD AUTO: 43.3 % (ref 38.5–50)
HDLC SERPL-MCNC: 32 MG/DL
HGB BLD-MCNC: 13.9 G/DL (ref 13.2–17.1)
LDLC SERPL CALC-MCNC: 82 MG/DL (CALC)
MCH RBC QN AUTO: 29.5 PG (ref 27–33)
MCHC RBC AUTO-ENTMCNC: 32.1 G/DL (ref 32–36)
MCV RBC AUTO: 91.9 FL (ref 80–100)
NONHDLC SERPL-MCNC: 114 MG/DL (CALC)
PLATELET # BLD AUTO: 219 THOUSAND/UL (ref 140–400)
PMV BLD REES-ECKER: 11 FL (ref 7.5–12.5)
POTASSIUM SERPL-SCNC: 3.9 MMOL/L (ref 3.5–5.3)
RBC # BLD AUTO: 4.71 MILLION/UL (ref 4.2–5.8)
SODIUM SERPL-SCNC: 138 MMOL/L (ref 135–146)
TRIGL SERPL-MCNC: 218 MG/DL
TSH SERPL-ACNC: 1.08 MIU/L (ref 0.4–4.5)
VIT B12 SERPL-MCNC: 975 PG/ML (ref 200–1100)
WBC # BLD AUTO: 6.5 THOUSAND/UL (ref 3.8–10.8)

## 2025-07-27 ENCOUNTER — ANCILLARY PROCEDURE (OUTPATIENT)
Dept: URGENT CARE | Age: 85
End: 2025-07-27
Payer: MEDICARE

## 2025-07-27 ENCOUNTER — CLINICAL SUPPORT (OUTPATIENT)
Dept: URGENT CARE | Age: 85
End: 2025-07-27
Payer: MEDICARE

## 2025-07-27 VITALS
OXYGEN SATURATION: 98 % | DIASTOLIC BLOOD PRESSURE: 70 MMHG | WEIGHT: 160.72 LBS | SYSTOLIC BLOOD PRESSURE: 117 MMHG | RESPIRATION RATE: 18 BRPM | HEART RATE: 56 BPM | BODY MASS INDEX: 27.37 KG/M2 | TEMPERATURE: 97.6 F

## 2025-07-27 DIAGNOSIS — S20.211A CONTUSION OF RIGHT CHEST WALL, INITIAL ENCOUNTER: Primary | ICD-10-CM

## 2025-07-27 DIAGNOSIS — R07.89 RIGHT-SIDED CHEST WALL PAIN: ICD-10-CM

## 2025-07-27 DIAGNOSIS — M25.511 ACUTE PAIN OF RIGHT SHOULDER: ICD-10-CM

## 2025-07-27 PROCEDURE — 73030 X-RAY EXAM OF SHOULDER: CPT | Mod: RIGHT SIDE

## 2025-07-27 PROCEDURE — 1160F RVW MEDS BY RX/DR IN RCRD: CPT

## 2025-07-27 PROCEDURE — 99202 OFFICE O/P NEW SF 15 MIN: CPT

## 2025-07-27 PROCEDURE — 3074F SYST BP LT 130 MM HG: CPT

## 2025-07-27 PROCEDURE — 71101 X-RAY EXAM UNILAT RIBS/CHEST: CPT | Mod: RIGHT SIDE

## 2025-07-27 PROCEDURE — 3078F DIAST BP <80 MM HG: CPT

## 2025-07-27 PROCEDURE — 1159F MED LIST DOCD IN RCRD: CPT

## 2025-07-27 ASSESSMENT — ENCOUNTER SYMPTOMS
WEAKNESS: 0
JOINT SWELLING: 0
BACK PAIN: 0
NUMBNESS: 0
VOMITING: 0
FATIGUE: 0
SPEECH DIFFICULTY: 0
ARTHRALGIAS: 1
CONFUSION: 0
COLOR CHANGE: 0
DIZZINESS: 0
COUGH: 0
FACIAL ASYMMETRY: 0
FEVER: 0
LIGHT-HEADEDNESS: 0
ABDOMINAL PAIN: 0
HEADACHES: 0
SHORTNESS OF BREATH: 0
CHILLS: 0

## 2025-07-27 NOTE — PROGRESS NOTES
Subjective   Patient ID: Jonathan Campos is a 85 y.o. male. They present today with a chief complaint of Fall (Fell on Thursday-- Right side pain on chest/shoulder. Constant pain /Did NOT hit head ).    History of Present Illness  Patient is an 85-year-old male presenting to the clinic with complaints of a fall.  Patient states a fall sustained Thursday 3 to 4 days ago.  No blood thinners no head injury no neck pain no back pain no numbness no tingling no weakness full range of motion of upper and lower extremities normal gait and ambulation no confusion alert and oriented x 3.  Patient states that he was trying to grab the phone as it was ringing while he was sitting in the recliner.  He states he went to hand the phone to his wife and sustained a mechanical fall no syncope.  He states he fell on the right shoulder and right chest wall.  He is now coming into the clinic with complaints of right chest wall tenderness pain over the right anterior chest wall with deep breathing pain to the right chest wall with palpation some mild pain to palpation of the anterior glenohumeral joint.      History provided by:  Patient  Trauma  Mechanism of injury: Fall     Current symptoms:       Associated symptoms:             Denies abdominal pain, back pain, headache and vomiting.     Past Medical History  Allergies as of 07/27/2025 - Reviewed 07/27/2025   Allergen Reaction Noted    Carisoprodol Other, Unknown, Anaphylaxis, and Rash 06/20/2023    Pravastatin sodium Other 08/28/2023    Simvastatin Other 08/28/2023       Prescriptions Prior to Admission[1]     Medical History[2]    Surgical History[3]     reports that he has never smoked. He has never been exposed to tobacco smoke. He has never used smokeless tobacco. He reports that he does not currently use alcohol. He reports that he does not use drugs.    Review of Systems  Review of Systems   Constitutional:  Negative for chills, fatigue and fever.   Respiratory:  Negative for  cough and shortness of breath.    Gastrointestinal:  Negative for abdominal pain and vomiting.   Musculoskeletal:  Positive for arthralgias. Negative for back pain and joint swelling.   Skin:  Negative for color change and rash.   Neurological:  Negative for dizziness, facial asymmetry, speech difficulty, weakness, light-headedness, numbness and headaches.   Psychiatric/Behavioral:  Negative for confusion.    All other systems reviewed and are negative.                                 Objective    Vitals:    07/27/25 0858   BP: 117/70   BP Location: Left arm   Patient Position: Sitting   BP Cuff Size: Adult   Pulse: 56   Resp: 18   Temp: 36.4 °C (97.6 °F)   TempSrc: Oral   SpO2: 98%   Weight: 72.9 kg (160 lb 11.5 oz)     No LMP for male patient.    Physical Exam  Vitals reviewed.   Constitutional:       General: He is not in acute distress.     Appearance: Normal appearance. He is normal weight. He is not ill-appearing or toxic-appearing.   HENT:      Head: Normocephalic and atraumatic.      Comments: No De La Cruz sign.  No raccoon sign.  No hemotympanum.     Right Ear: Tympanic membrane, ear canal and external ear normal.      Left Ear: Tympanic membrane, ear canal and external ear normal.      Nose: Nose normal.      Comments: No rhinorrhea.     Mouth/Throat:      Mouth: Mucous membranes are moist.      Comments: Oropharynx clear normal soft palate phonation    Eyes:      Extraocular Movements: Extraocular movements intact.      Conjunctiva/sclera: Conjunctivae normal.      Pupils: Pupils are equal, round, and reactive to light.       Cardiovascular:      Rate and Rhythm: Normal rate and regular rhythm.      Heart sounds: Normal heart sounds. No murmur heard.     No friction rub. No gallop.   Pulmonary:      Effort: Pulmonary effort is normal. No respiratory distress.      Breath sounds: Normal breath sounds. No stridor. No wheezing, rhonchi or rales.     Musculoskeletal:         General: Normal range of motion.       Cervical back: Normal range of motion. No tenderness.      Comments: Normocephalic atraumatic no tenderness to palpation of the neck thoracic or lumbar spine midline or otherwise.  No vertebral step-offs.  No tenderness to palpation of the left shoulder left humerus left forearm left hand normal left radial pulse.  Right upper extremity no pain to palpation over the trapezius scapula clavicle mild tenderness over the AC region no tenderness to the humerus elbow right hand right wrist normal right radial pulse normal  strength bilaterally normal range of motion of upper extremities at the shoulder.  No tenderness to palpation over the right ribs including the right back thoracic ribs axillary ribs.  Some tenderness to palpation over the right chest wall reproducible point tender no bruising or skin changes.   Lymphadenopathy:      Cervical: No cervical adenopathy.     Neurological:      Mental Status: He is alert.         Procedures    Point of Care Test & Imaging Results from this visit  No results found for this visit on 07/27/25.   Imaging  No results found.    Cardiology, Vascular, and Other Imaging  No other imaging results found for the past 2 days      Diagnostic study results (if any) were reviewed by [facility].    Assessment/Plan   Allergies, medications, history, and pertinent labs/EKGs/Imaging reviewed by Micheal Burgos PA-C.     Medical Decision Making:    Patient is an 85-year-old male presenting to the clinic with complaints of a fall.  Patient states a fall sustained Thursday 3 to 4 days ago.  No blood thinners no head injury no neck pain no back pain no numbness no tingling no weakness full range of motion of upper and lower extremities normal gait and ambulation no confusion alert and oriented x 3.  Patient states that he was trying to grab the phone as it was ringing while he was sitting in the recliner.  He states he went to hand the phone to his wife and sustained a mechanical fall  "no syncope.  He states he fell on the right shoulder and right chest wall.  He is now coming into the clinic with complaints of right chest wall tenderness pain over the right anterior chest wall with deep breathing pain to the right chest wall with palpation some mild pain to palpation of the anterior glenohumeral joint. Normocephalic atraumatic no tenderness to palpation of the neck thoracic or lumbar spine midline or otherwise.  No vertebral step-offs.  No tenderness to palpation of the left shoulder left humerus left forearm left hand normal left radial pulse.  Right upper extremity no pain to palpation over the trapezius scapula clavicle mild tenderness over the AC region no tenderness to the humerus elbow right hand right wrist normal right radial pulse normal  strength bilaterally normal range of motion of upper extremities at the shoulder.  No tenderness to palpation over the right ribs including the right back thoracic ribs axillary ribs.  Some tenderness to palpation over the right chest wall reproducible point tender no bruising or skin changes.    Orders and Diagnoses  There are no diagnoses linked to this encounter.    Medical Admin Record      Patient disposition: { Disposition:48871::\"Home\"}    Electronically signed by [facility]  9:21 AM           [1] (Not in a hospital admission)  [2]   Past Medical History:  Diagnosis Date    Acquired hearing loss 08/28/2023    Acquired spondylolisthesis 03/16/2018    Benign hypertension 03/17/2022    David    Carpal tunnel syndrome 08/28/2023    Cervicalgia 08/28/2023    Chronic lumbar radiculopathy 08/28/2023    Chronic rhinitis 08/28/2023    Gastro-esophageal reflux disease without esophagitis 08/28/2023    History of malignant neoplasm of prostate 11/02/2023    '99 prostectomy     History of pancreatitis 06/04/2024    GI CNP to see in August which is fine. Small cyst in pancreas, MRCP. No worries, no alcohol.    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    Prediabetes 09/09/2024    Seasonal allergic rhinitis 08/28/2023    Sensorineural hearing loss (SNHL) of both ears 05/14/2024    Spinal stenosis 08/28/2023    Vitamin D deficiency 08/28/2023   [3]   Past Surgical History:  Procedure Laterality Date    CARPAL TUNNEL RELEASE      CT GUIDED PERCUTANEOUS BIOPSY BONE DEEP  12/09/2020    CT GUIDED PERCUTANEOUS BIOPSY BONE DEEP 12/9/2020 [facility]    LUMBAR LAMINECTOMY      PROSTATECTOMY  1999      pulmonary fibrosis (Multi) 09/30/2022    Prediabetes 09/09/2024    Seasonal allergic rhinitis 08/28/2023    Sensorineural hearing loss (SNHL) of both ears 05/14/2024    Spinal stenosis 08/28/2023    Vitamin D deficiency 08/28/2023   [3]   Past Surgical History:  Procedure Laterality Date    CARPAL TUNNEL RELEASE      CT GUIDED PERCUTANEOUS BIOPSY BONE DEEP  12/09/2020    CT GUIDED PERCUTANEOUS BIOPSY BONE DEEP 12/9/2020 [facility]    LUMBAR LAMINECTOMY      PROSTATECTOMY  1999

## 2025-07-27 NOTE — PATIENT INSTRUCTIONS
Discharge instructions    Please follow up with your Primary Care Physician within the next 5 days.    X-ray is read as no acute fracture or dislocation or lung injury.    Likely contusion of right chest wall and right shoulder.  Recommending continuing over-the-counter pain control as needed.    If you develop any dizziness headaches visual dysfunction numbness tingling weakness severe pain worsening symptoms shortness of breath or difficulty breathing or heart chest pain to immediately go to the emergency room for evaluation.    It is important to take prescriptions as prescribed and complete all antibiotics.     If your symptoms worsen you are instructed to immediately go to the emergency room for reevaluation and further assessment.    If you develop any chest pain, SOB, or difficulty breathing you are instructed to go to the emergency room for reevaluation.    All discharge instructions will be provided and explained to the patient at discharge.    If you have any questions regarding your treatment plan please call the [facility] urgent care clinic.

## 2025-08-13 ENCOUNTER — NURSE TRIAGE (OUTPATIENT)
Dept: AUDIOLOGY | Facility: CLINIC | Age: 85
End: 2025-08-13
Payer: MEDICARE